# Patient Record
Sex: FEMALE | URBAN - METROPOLITAN AREA
[De-identification: names, ages, dates, MRNs, and addresses within clinical notes are randomized per-mention and may not be internally consistent; named-entity substitution may affect disease eponyms.]

---

## 2023-05-03 ENCOUNTER — OFFICE (OUTPATIENT)
Dept: URBAN - METROPOLITAN AREA CLINIC 90 | Facility: CLINIC | Age: 78
Setting detail: OPHTHALMOLOGY
End: 2023-05-03
Payer: MEDICARE

## 2023-05-03 VITALS — HEIGHT: 55 IN

## 2023-05-03 DIAGNOSIS — H01.002: ICD-10-CM

## 2023-05-03 DIAGNOSIS — H35.373: ICD-10-CM

## 2023-05-03 DIAGNOSIS — H35.3111: ICD-10-CM

## 2023-05-03 DIAGNOSIS — H16.223: ICD-10-CM

## 2023-05-03 DIAGNOSIS — H35.433: ICD-10-CM

## 2023-05-03 DIAGNOSIS — H35.033: ICD-10-CM

## 2023-05-03 DIAGNOSIS — H01.005: ICD-10-CM

## 2023-05-03 DIAGNOSIS — H25.13: ICD-10-CM

## 2023-05-03 PROCEDURE — 92004 COMPRE OPH EXAM NEW PT 1/>: CPT | Performed by: OPHTHALMOLOGY

## 2023-05-03 PROCEDURE — 92250 FUNDUS PHOTOGRAPHY W/I&R: CPT | Performed by: OPHTHALMOLOGY

## 2023-05-03 ASSESSMENT — VISUAL ACUITY
OS_BCVA: 20/40-2
OD_BCVA: 20/30-2

## 2023-05-03 ASSESSMENT — REFRACTION_MANIFEST
OD_ADD: +2.25
OD_SPHERE: +3.75
OS_CYLINDER: -0.75
OS_AXIS: 071
OD_SPHERE: +1.50
OD_AXIS: 101
OD_ADD: +2.25
OS_VA2: 20/25(J1)
OS_SPHERE: +0.75
OS_CYLINDER: -0.75
OD_CYLINDER: -1.75
OS_VA1: 20/25-
OS_SPHERE: +1.00
OS_ADD: +2.25
OD_VA1: 20/25-
OS_VA1: 20/30+
OD_SPHERE: +1.00
OD_AXIS: 097
OD_CYLINDER: -1.00
OD_AXIS: 101
OD_CYLINDER: -1.75
OS_CYLINDER: -0.75
OS_AXIS: 069
OS_SPHERE: +3.25
OD_VA1: 20/25
OD_VA1: 20/25-
OS_VA1: 20/30+
OS_AXIS: 069
OD_VA2: 20/25(J1)
OS_ADD: +2.25
OS_VA2: 20/25(J1)
OD_VA2: 20/25(J1)

## 2023-05-03 ASSESSMENT — LID POSITION - DERMATOCHALASIS
OD_DERMATOCHALASIS: RLL RUL 1+
OS_DERMATOCHALASIS: LLL LUL 1+

## 2023-05-03 ASSESSMENT — REFRACTION_AUTOREFRACTION
OD_AXIS: 103
OS_CYLINDER: -0.75
OD_CYLINDER: -2.00
OS_AXIS: 080
OD_SPHERE: +2.00
OS_SPHERE: +1.50

## 2023-05-03 ASSESSMENT — KERATOMETRY
OD_AXISANGLE_DEGREES: 025
OS_K2POWER_DIOPTERS: 43.50
OS_AXISANGLE_DEGREES: 179
OD_K2POWER_DIOPTERS: 44.00
METHOD_AUTO_MANUAL: AUTO
OD_K1POWER_DIOPTERS: 43.00
OS_K1POWER_DIOPTERS: 43.00

## 2023-05-03 ASSESSMENT — REFRACTION_CURRENTRX
OD_CYLINDER: -1.00
OS_OVR_VA: 20/
OD_OVR_VA: 20/
OD_SPHERE: +3.00
OS_CYLINDER: -0.50
OD_AXIS: 097
OD_VPRISM_DIRECTION: SV
OS_AXIS: 060
OS_SPHERE: +2.50

## 2023-05-03 ASSESSMENT — TONOMETRY
OD_IOP_MMHG: 14
OS_IOP_MMHG: 16

## 2023-05-03 ASSESSMENT — AXIALLENGTH_DERIVED
OS_AL: 22.6066
OS_AL: 23.4412
OD_AL: 22.5226
OD_AL: 23.3987
OD_AL: 23.2086
OS_AL: 23.2504
OD_AL: 23.3509
OS_AL: 23.5378

## 2023-05-03 ASSESSMENT — SPHEQUIV_DERIVED
OD_SPHEQUIV: 2.875
OD_SPHEQUIV: 0.625
OD_SPHEQUIV: 0.5
OD_SPHEQUIV: 1
OS_SPHEQUIV: 0.625
OS_SPHEQUIV: 2.875
OS_SPHEQUIV: 1.125
OS_SPHEQUIV: 0.375

## 2023-05-03 ASSESSMENT — TEAR BREAK UP TIME (TBUT)
OD_TBUT: 1+
OS_TBUT: 1+

## 2023-05-03 ASSESSMENT — CONFRONTATIONAL VISUAL FIELD TEST (CVF)
OD_FINDINGS: FULL
OS_FINDINGS: FULL

## 2025-04-20 ENCOUNTER — INPATIENT (INPATIENT)
Facility: HOSPITAL | Age: 80
LOS: 4 days | Discharge: NURSING, REHAB OR LONG-TERM CARE | DRG: 534 | End: 2025-04-25
Attending: ORTHOPAEDIC SURGERY | Admitting: ORTHOPAEDIC SURGERY
Payer: MEDICARE

## 2025-04-20 VITALS
WEIGHT: 119.93 LBS | TEMPERATURE: 98 F | HEART RATE: 87 BPM | RESPIRATION RATE: 18 BRPM | DIASTOLIC BLOOD PRESSURE: 66 MMHG | SYSTOLIC BLOOD PRESSURE: 181 MMHG | HEIGHT: 65 IN | OXYGEN SATURATION: 95 %

## 2025-04-20 DIAGNOSIS — S72.91XA UNSPECIFIED FRACTURE OF RIGHT FEMUR, INITIAL ENCOUNTER FOR CLOSED FRACTURE: ICD-10-CM

## 2025-04-20 DIAGNOSIS — Z87.442 PERSONAL HISTORY OF URINARY CALCULI: Chronic | ICD-10-CM

## 2025-04-20 LAB
ALBUMIN SERPL ELPH-MCNC: 4.4 G/DL — SIGNIFICANT CHANGE UP (ref 3.3–5)
ALP SERPL-CCNC: 51 U/L — SIGNIFICANT CHANGE UP (ref 40–120)
ALT FLD-CCNC: 19 U/L — SIGNIFICANT CHANGE UP (ref 10–45)
ANION GAP SERPL CALC-SCNC: 16 MMOL/L — SIGNIFICANT CHANGE UP (ref 5–17)
ANION GAP SERPL CALC-SCNC: 18 MMOL/L — HIGH (ref 5–17)
APTT BLD: 51.9 SEC — HIGH (ref 24.5–35.6)
AST SERPL-CCNC: 26 U/L — SIGNIFICANT CHANGE UP (ref 10–40)
BASOPHILS # BLD AUTO: 0.04 K/UL — SIGNIFICANT CHANGE UP (ref 0–0.2)
BASOPHILS NFR BLD AUTO: 0.5 % — SIGNIFICANT CHANGE UP (ref 0–2)
BILIRUB SERPL-MCNC: 0.5 MG/DL — SIGNIFICANT CHANGE UP (ref 0.2–1.2)
BLD GP AB SCN SERPL QL: NEGATIVE — SIGNIFICANT CHANGE UP
BUN SERPL-MCNC: 26 MG/DL — HIGH (ref 7–23)
BUN SERPL-MCNC: 32 MG/DL — HIGH (ref 7–23)
CALCIUM SERPL-MCNC: 10.2 MG/DL — SIGNIFICANT CHANGE UP (ref 8.4–10.5)
CALCIUM SERPL-MCNC: 8.5 MG/DL — SIGNIFICANT CHANGE UP (ref 8.4–10.5)
CHLORIDE SERPL-SCNC: 103 MMOL/L — SIGNIFICANT CHANGE UP (ref 96–108)
CHLORIDE SERPL-SCNC: 103 MMOL/L — SIGNIFICANT CHANGE UP (ref 96–108)
CO2 SERPL-SCNC: 17 MMOL/L — LOW (ref 22–31)
CO2 SERPL-SCNC: 20 MMOL/L — LOW (ref 22–31)
CREAT SERPL-MCNC: 1.13 MG/DL — SIGNIFICANT CHANGE UP (ref 0.5–1.3)
CREAT SERPL-MCNC: 1.25 MG/DL — SIGNIFICANT CHANGE UP (ref 0.5–1.3)
EGFR: 44 ML/MIN/1.73M2 — LOW
EGFR: 44 ML/MIN/1.73M2 — LOW
EGFR: 49 ML/MIN/1.73M2 — LOW
EGFR: 49 ML/MIN/1.73M2 — LOW
EOSINOPHIL # BLD AUTO: 0.1 K/UL — SIGNIFICANT CHANGE UP (ref 0–0.5)
EOSINOPHIL NFR BLD AUTO: 1.3 % — SIGNIFICANT CHANGE UP (ref 0–6)
ETHANOL SERPL-MCNC: <10 MG/DL — SIGNIFICANT CHANGE UP (ref 0–10)
GAS PNL BLDV: SIGNIFICANT CHANGE UP
GLUCOSE SERPL-MCNC: 120 MG/DL — HIGH (ref 70–99)
GLUCOSE SERPL-MCNC: 136 MG/DL — HIGH (ref 70–99)
HCT VFR BLD CALC: 36.9 % — SIGNIFICANT CHANGE UP (ref 34.5–45)
HCT VFR BLD CALC: 38.3 % — SIGNIFICANT CHANGE UP (ref 34.5–45)
HGB BLD-MCNC: 12.2 G/DL — SIGNIFICANT CHANGE UP (ref 11.5–15.5)
HGB BLD-MCNC: 13.3 G/DL — SIGNIFICANT CHANGE UP (ref 11.5–15.5)
IMM GRANULOCYTES NFR BLD AUTO: 0.3 % — SIGNIFICANT CHANGE UP (ref 0–0.9)
INR BLD: 0.95 RATIO — SIGNIFICANT CHANGE UP (ref 0.85–1.16)
LIDOCAIN IGE QN: 68 U/L — HIGH (ref 7–60)
LYMPHOCYTES # BLD AUTO: 3.11 K/UL — SIGNIFICANT CHANGE UP (ref 1–3.3)
LYMPHOCYTES # BLD AUTO: 40.3 % — SIGNIFICANT CHANGE UP (ref 13–44)
MCHC RBC-ENTMCNC: 30 PG — SIGNIFICANT CHANGE UP (ref 27–34)
MCHC RBC-ENTMCNC: 31.2 PG — SIGNIFICANT CHANGE UP (ref 27–34)
MCHC RBC-ENTMCNC: 33.1 G/DL — SIGNIFICANT CHANGE UP (ref 32–36)
MCHC RBC-ENTMCNC: 34.7 G/DL — SIGNIFICANT CHANGE UP (ref 32–36)
MCV RBC AUTO: 89.9 FL — SIGNIFICANT CHANGE UP (ref 80–100)
MCV RBC AUTO: 90.9 FL — SIGNIFICANT CHANGE UP (ref 80–100)
MONOCYTES # BLD AUTO: 0.47 K/UL — SIGNIFICANT CHANGE UP (ref 0–0.9)
MONOCYTES NFR BLD AUTO: 6.1 % — SIGNIFICANT CHANGE UP (ref 2–14)
NEUTROPHILS # BLD AUTO: 3.97 K/UL — SIGNIFICANT CHANGE UP (ref 1.8–7.4)
NEUTROPHILS NFR BLD AUTO: 51.5 % — SIGNIFICANT CHANGE UP (ref 43–77)
NRBC BLD AUTO-RTO: 0 /100 WBCS — SIGNIFICANT CHANGE UP (ref 0–0)
NRBC BLD AUTO-RTO: 0 /100 WBCS — SIGNIFICANT CHANGE UP (ref 0–0)
PLATELET # BLD AUTO: 212 K/UL — SIGNIFICANT CHANGE UP (ref 150–400)
PLATELET # BLD AUTO: 213 K/UL — SIGNIFICANT CHANGE UP (ref 150–400)
POTASSIUM SERPL-MCNC: 3.8 MMOL/L — SIGNIFICANT CHANGE UP (ref 3.5–5.3)
POTASSIUM SERPL-MCNC: 4.5 MMOL/L — SIGNIFICANT CHANGE UP (ref 3.5–5.3)
POTASSIUM SERPL-SCNC: 3.8 MMOL/L — SIGNIFICANT CHANGE UP (ref 3.5–5.3)
POTASSIUM SERPL-SCNC: 4.5 MMOL/L — SIGNIFICANT CHANGE UP (ref 3.5–5.3)
PROT SERPL-MCNC: 7.4 G/DL — SIGNIFICANT CHANGE UP (ref 6–8.3)
PROTHROM AB SERPL-ACNC: 10.8 SEC — SIGNIFICANT CHANGE UP (ref 9.9–13.4)
RBC # BLD: 4.06 M/UL — SIGNIFICANT CHANGE UP (ref 3.8–5.2)
RBC # BLD: 4.26 M/UL — SIGNIFICANT CHANGE UP (ref 3.8–5.2)
RBC # FLD: 12.6 % — SIGNIFICANT CHANGE UP (ref 10.3–14.5)
RBC # FLD: 12.7 % — SIGNIFICANT CHANGE UP (ref 10.3–14.5)
RH IG SCN BLD-IMP: POSITIVE — SIGNIFICANT CHANGE UP
SODIUM SERPL-SCNC: 138 MMOL/L — SIGNIFICANT CHANGE UP (ref 135–145)
SODIUM SERPL-SCNC: 139 MMOL/L — SIGNIFICANT CHANGE UP (ref 135–145)
WBC # BLD: 11.79 K/UL — HIGH (ref 3.8–10.5)
WBC # BLD: 7.71 K/UL — SIGNIFICANT CHANGE UP (ref 3.8–10.5)
WBC # FLD AUTO: 11.79 K/UL — HIGH (ref 3.8–10.5)
WBC # FLD AUTO: 7.71 K/UL — SIGNIFICANT CHANGE UP (ref 3.8–10.5)

## 2025-04-20 PROCEDURE — 74177 CT ABD & PELVIS W/CONTRAST: CPT | Mod: 26

## 2025-04-20 PROCEDURE — 72131 CT LUMBAR SPINE W/O DYE: CPT | Mod: 26

## 2025-04-20 PROCEDURE — 70450 CT HEAD/BRAIN W/O DYE: CPT | Mod: 26

## 2025-04-20 PROCEDURE — 72128 CT CHEST SPINE W/O DYE: CPT | Mod: 26

## 2025-04-20 PROCEDURE — 73552 X-RAY EXAM OF FEMUR 2/>: CPT | Mod: 26,RT,77

## 2025-04-20 PROCEDURE — 93010 ELECTROCARDIOGRAM REPORT: CPT

## 2025-04-20 PROCEDURE — 99285 EMERGENCY DEPT VISIT HI MDM: CPT

## 2025-04-20 PROCEDURE — 73552 X-RAY EXAM OF FEMUR 2/>: CPT | Mod: 26,RT

## 2025-04-20 PROCEDURE — 71045 X-RAY EXAM CHEST 1 VIEW: CPT | Mod: 26

## 2025-04-20 PROCEDURE — 72170 X-RAY EXAM OF PELVIS: CPT | Mod: 26,XE

## 2025-04-20 PROCEDURE — 73502 X-RAY EXAM HIP UNI 2-3 VIEWS: CPT | Mod: 26,RT

## 2025-04-20 PROCEDURE — 73560 X-RAY EXAM OF KNEE 1 OR 2: CPT | Mod: 26,RT

## 2025-04-20 PROCEDURE — 71260 CT THORAX DX C+: CPT | Mod: 26

## 2025-04-20 PROCEDURE — 72125 CT NECK SPINE W/O DYE: CPT | Mod: 26

## 2025-04-20 RX ORDER — POLYETHYLENE GLYCOL 3350 17 G/17G
17 POWDER, FOR SOLUTION ORAL AT BEDTIME
Refills: 0 | Status: DISCONTINUED | OUTPATIENT
Start: 2025-04-20 | End: 2025-04-25

## 2025-04-20 RX ORDER — MAGNESIUM HYDROXIDE 400 MG/5ML
30 SUSPENSION ORAL DAILY
Refills: 0 | Status: DISCONTINUED | OUTPATIENT
Start: 2025-04-20 | End: 2025-04-25

## 2025-04-20 RX ORDER — ACETAMINOPHEN 500 MG/5ML
1000 LIQUID (ML) ORAL ONCE
Refills: 0 | Status: COMPLETED | OUTPATIENT
Start: 2025-04-20 | End: 2025-04-20

## 2025-04-20 RX ORDER — TRAMADOL HYDROCHLORIDE 50 MG/1
50 TABLET, FILM COATED ORAL EVERY 6 HOURS
Refills: 0 | Status: DISCONTINUED | OUTPATIENT
Start: 2025-04-20 | End: 2025-04-25

## 2025-04-20 RX ORDER — OXYCODONE HYDROCHLORIDE 30 MG/1
5 TABLET ORAL
Refills: 0 | Status: DISCONTINUED | OUTPATIENT
Start: 2025-04-20 | End: 2025-04-25

## 2025-04-20 RX ORDER — RIVAROXABAN 10 MG/1
10 TABLET, FILM COATED ORAL DAILY
Refills: 0 | Status: DISCONTINUED | OUTPATIENT
Start: 2025-04-21 | End: 2025-04-25

## 2025-04-20 RX ORDER — ONDANSETRON HCL/PF 4 MG/2 ML
4 VIAL (ML) INJECTION ONCE
Refills: 0 | Status: DISCONTINUED | OUTPATIENT
Start: 2025-04-20 | End: 2025-04-20

## 2025-04-20 RX ORDER — HYDROMORPHONE/SOD CHLOR,ISO/PF 2 MG/10 ML
0.5 SYRINGE (ML) INJECTION
Refills: 0 | Status: DISCONTINUED | OUTPATIENT
Start: 2025-04-20 | End: 2025-04-20

## 2025-04-20 RX ORDER — SENNA 187 MG
2 TABLET ORAL AT BEDTIME
Refills: 0 | Status: DISCONTINUED | OUTPATIENT
Start: 2025-04-20 | End: 2025-04-25

## 2025-04-20 RX ORDER — ONDANSETRON HCL/PF 4 MG/2 ML
4 VIAL (ML) INJECTION EVERY 6 HOURS
Refills: 0 | Status: DISCONTINUED | OUTPATIENT
Start: 2025-04-20 | End: 2025-04-25

## 2025-04-20 RX ORDER — OXYCODONE HYDROCHLORIDE 30 MG/1
10 TABLET ORAL
Refills: 0 | Status: DISCONTINUED | OUTPATIENT
Start: 2025-04-20 | End: 2025-04-25

## 2025-04-20 RX ORDER — ACETAMINOPHEN 500 MG/5ML
975 LIQUID (ML) ORAL EVERY 8 HOURS
Refills: 0 | Status: DISCONTINUED | OUTPATIENT
Start: 2025-04-20 | End: 2025-04-25

## 2025-04-20 RX ORDER — CEFAZOLIN SODIUM IN 0.9 % NACL 3 G/100 ML
2000 INTRAVENOUS SOLUTION, PIGGYBACK (ML) INTRAVENOUS EVERY 8 HOURS
Refills: 0 | Status: COMPLETED | OUTPATIENT
Start: 2025-04-20 | End: 2025-04-21

## 2025-04-20 RX ORDER — HYDROMORPHONE/SOD CHLOR,ISO/PF 2 MG/10 ML
1 SYRINGE (ML) INJECTION
Refills: 0 | Status: DISCONTINUED | OUTPATIENT
Start: 2025-04-20 | End: 2025-04-20

## 2025-04-20 RX ADMIN — Medication 400 MILLIGRAM(S): at 16:00

## 2025-04-20 RX ADMIN — Medication 4 MILLIGRAM(S): at 15:32

## 2025-04-20 RX ADMIN — Medication 1000 MILLIGRAM(S): at 22:50

## 2025-04-20 RX ADMIN — Medication 4 MILLIGRAM(S): at 17:54

## 2025-04-20 RX ADMIN — Medication 400 MILLIGRAM(S): at 22:22

## 2025-04-20 RX ADMIN — Medication 125 MILLILITER(S): at 22:23

## 2025-04-20 NOTE — H&P ADULT - HISTORY OF PRESENT ILLNESS
HPI  80yFemale w/ pmh of hypertension, anxiety c/o R hip pain s/p mechanical fall 5 feet from a ladder this afternoon. Unable to bear weight in the RLE since the fall. Denies headstrike or LOC. Denies numbness/tingling in the RLE. Denies any other trauma/injuries at this time. At baseline, community ambulator w/o assistive devices.    ROS  Negative unless otherwise specified in HPI.    PAST MEDICAL & SURGICAL Hx  PAST MEDICAL & SURGICAL HISTORY:  Hypertension          MEDICATIONS  Home Medications:      ALLERGIES  No Known Allergies      FAMILY Hx  FAMILY HISTORY:      SOCIAL Hx  Social History:      VITALS  Vital Signs Last 24 Hrs  T(C): 36.7 (20 Apr 2025 17:44), Max: 36.7 (20 Apr 2025 17:44)  T(F): 98 (20 Apr 2025 17:44), Max: 98 (20 Apr 2025 17:44)  HR: 78 (20 Apr 2025 17:44) (78 - 89)  BP: 228/94 (20 Apr 2025 17:44) (181/66 - 245/112)  BP(mean): --  RR: 26 (20 Apr 2025 17:44) (18 - 27)  SpO2: 99% (20 Apr 2025 17:44) (95% - 99%)    Parameters below as of 20 Apr 2025 17:44  Patient On (Oxygen Delivery Method): room air        PHYSICAL EXAM  Gen: Lying in bed, NAD  Resp: No increased WOB  RLE:  Skin intact, shortened and externally rotated, +edema and +ecchymosis over R hip  +TTP over R thigh, no TTP along remainder of extremity; compartments soft  Motor: TA/EHL/GS/FHL intact  Sensory: DP/SP/Tib/Leann/Saph SILT  +DP pulse, WWP    Secondary survey:  No TTP along spine or other extremities, pelvis grossly stable, SILT and compartments soft throughout    LABS                        13.3   7.71  )-----------( 212      ( 20 Apr 2025 15:38 )             38.3     04-20    138  |  103  |  32[H]  ----------------------------<  120[H]  3.8   |  17[L]  |  1.25    Ca    10.2      20 Apr 2025 15:38    TPro  7.4  /  Alb  4.4  /  TBili  0.5  /  DBili  x   /  AST  26  /  ALT  19  /  AlkPhos  51  04-20    PT/INR - ( 20 Apr 2025 15:38 )   PT: 10.8 sec;   INR: 0.95 ratio         PTT - ( 20 Apr 2025 15:38 )  PTT:51.9 sec    IMAGING  XRs: R femoral shaft fx (personal read)    ASSESSMENT & PLAN  80yFemale w/ R femoral shaft fx.  -NWB RLE, bedrest  -OR 4/20/25  -hold chemical DVT ppx for OR; SCDs OK  -pain control  -ice/cold compress

## 2025-04-20 NOTE — PRE-ANESTHESIA EVALUATION ADULT - BMI (KG/M2)
Detail Level: Detailed Was A Bandage Applied: Yes Punch Size In Mm: 4 Biopsy Type: H and E Anesthesia Type: 1% lidocaine with epinephrine Anesthesia Volume In Cc: 0.5 Additional Anesthesia Volume In Cc (Will Not Render If 0): 0 Hemostasis: None Epidermal Sutures: 4-0 Plain Gut 20 Wound Care: Petrolatum Dressing: bandage Suture Removal: 14 days Patient Will Remove Sutures At Home?: No Lab: [id number] Lab Facility: [id number] Consent: Written consent was obtained and risks were reviewed including but not limited to scarring, infection, bleeding, scabbing, incomplete removal, nerve damage and allergy to anesthesia. Post-Care Instructions: I reviewed with the patient in detail post-care instructions. Patient is to keep the biopsy site dry overnight, and then apply bacitracin twice daily until healed. Patient may apply hydrogen peroxide soaks to remove any crusting. Home Suture Removal Text: Patient was provided a home suture removal kit and will remove their sutures at home.  If they have any questions or difficulties they will call the office. Notification Instructions: Patient will be notified of biopsy results. However, patient instructed to call the office if not contacted within 2 weeks. Billing Type: Third-Party Bill Information: Selecting Yes will display possible errors in your note based on the variables you have selected. This validation is only offered as a suggestion for you. PLEASE NOTE THAT THE VALIDATION TEXT WILL BE REMOVED WHEN YOU FINALIZE YOUR NOTE. IF YOU WANT TO FAX A PRELIMINARY NOTE YOU WILL NEED TO TOGGLE THIS TO 'NO' IF YOU DO NOT WANT IT IN YOUR FAXED NOTE.

## 2025-04-20 NOTE — ED ADULT NURSE NOTE - OBJECTIVE STATEMENT
81 yo female with a PMH of HTN and anxiety presents to the ED via EMS from home complaining of an unwitnessed fall. Patient was on a 10foot ladder when she had a misstep falling backwards. Denies LOC or AC use. Patient landed on her R side of hip. Was unable to ambulate after and had to have neighbors and  call 911. PIV established by EMS, 18G LAC. Total of 100mcg of Fentanyl given PTA. Upon arrival, obvious deformity noted to R hip. + pulses, extremity is warm to touch. Sensation in tact. Arrives in c-collar. Denies headache, dizziness, vision changes, chest pain, shortness of breath, abdominal pain, nausea, vomiting, diarrhea, fevers, chills, dysuria, hematuria, recent illness travel.

## 2025-04-20 NOTE — ED ADULT NURSE REASSESSMENT NOTE - NS ED NURSE REASSESS COMMENT FT1
Indwelling urinary catheter placed using aseptic technique. Sterile equipment utalized. Second RN present to confirm sterility. Draining to gravity - initial output of 150ml. Secured w/ stat lock to upper leg. Explained procedure as it was being done - Pt tolerated procedure well. Comfort and safety provided.

## 2025-04-20 NOTE — ED ADULT NURSE NOTE - NSFALLHARMRISKINTERV_ED_ALL_ED

## 2025-04-20 NOTE — ED PROVIDER NOTE - ATTENDING CONTRIBUTION TO CARE
80-year-old female with past medical history of hypertension, presents to the emergency department with right lower extremity injury status post fall today.  Per EMS, patient fell from 10 foot ladder that was not secured.  Patient denies head strike or loss of consciousness.  Not on anticoagulation.  EMS provided 100 mcg of fentanyl prior to arrival which pt reports minimal relief. Denies fever, chills, chest pain, shortness of breath, abdominal pain, n/v/d, numbness, weakness, tingling, headache.     Physical Exam:  Gen: Awake and alert, moderate acute distress.  HEENT: Atraumatic, oropharynx clear, moist mucous membranes, normal conjunctiva  Cardio: RRR, no murmurs, rubs or gallops  Lung: CTAB, no respiratory distress, no wheezes/rhonchi/rales B/L  Abd: soft, NT, ND, no guarding, no rigidity, no rebound tenderness  MSK: No cervical, thoracic, lumbar midline tenderness to palpation or bony step-offs.  RLE: Obvious deformity, extremity is externally rotated and shortened.  Significant swelling to right hip.  Pelvis is stable. NV intact. 2+dp pulses. compartment is soft  Neuro: No focal sensory or motor deficits  Skin: Warm, well perfused, no rash, no leg swelling     Patient presents with traumatic right lower extremity injury.  Hypertensive, otherwise vitals within normal limits.  Nontoxic-appearing.  Considering hip fracture versus dislocation, pelvis fracture, intracranial hemorrhage, internal organ injury, thoracic/lumbar bony pathology.  FAST negative for free fluid  CBC, CMP, coags, VBG, pan scan, x-rays, reassess

## 2025-04-20 NOTE — CONSULT NOTE ADULT - TIME BILLING
I saw and evaluated patient and agree with residents note  injury localized to right femor  no further concerns from trauma standpoint  agree with orthopedic surgery planned intervention

## 2025-04-20 NOTE — ED PROVIDER NOTE - PHYSICAL EXAMINATION
GENERAL: +in acute distress and discomfort  HEAD: normocephalic, atraumatic, no camacho sign or raccoon sign  HEENT: normal conjunctiva, oral mucosa moist, neck supple  CARDIAC: regular rate and rhythm, normal S1S2, no appreciable murmurs, 2+ pulses in UE/LE b/l  PULM: normal breath sounds, clear to ascultation bilaterally, no rales, rhonchi, wheezing  GI: abdomen nondistended, soft, nontender, no guarding, rebound tenderness  : no pelvic instability, no CVA tenderness b/l, no suprapubic tenderness  NEURO: moving all extremities, no focal motor or sensory deficits, CN2-12 intact, normal speech, PERRLA, EOMI, AAOx3  MSK: +R hip external rotated and shortened with surround edema and exquisite tenderness  SKIN: +L knee abrasion, well-perfused, extremities warm, no visible rashes  PSYCH: appropriate mood and affect

## 2025-04-20 NOTE — ED PROVIDER NOTE - PROGRESS NOTE DETAILS
Darrell Gallagher MD  XR shows fractured R femur. Ortho consulted: to see patient Darrell Gallagher MD  ortho to take pt to OR. Trauma team aware and to see patient Meng Odell, PGY3 - patient admitted to ortho service under Ashu Asencio

## 2025-04-20 NOTE — ED PROVIDER NOTE - NS ED ROS FT
General: +fall, denies fever, chills  HENT: denies nasal congestion, rhinorrhea  Eyes: denies visual changes, blurred vision  CV: denies chest pain, palpitations  Resp: denies difficulty breathing, cough  Abdominal: denies nausea, vomiting, diarrhea, abdominal pain  : denies urinary pain or discharge  MSK: +R hip pain, denies spinal or pelvic pain  Neuro: denies headaches, numbness, tingling, LOC  Skin: denies rashes, bruises

## 2025-04-20 NOTE — ED ADULT NURSE NOTE - NS ED NURSE PATIENT LEFT UNIT TIME
Patient to ed with complaints of head pain in the back of her head which patient noticed when she woke-up Sat. Patient reports her last known well date was Friday evening when she went to bed. Patient has numbness in her right leg however she reports this has been a chronic problem for her and is not new. Patient also reports she has been taking medicine for \"heart burn\" ekg bedside.      Jose Dickson RN  04/01/22 0465 19:12

## 2025-04-20 NOTE — ED PROVIDER NOTE - CLINICAL SUMMARY MEDICAL DECISION MAKING FREE TEXT BOX
this is a 80-year-old female past medical history of hypertension presenting after fall.  Shortly prior to arrival patient was on a 10 foot ladder that was not secured leading her to fall.  She endorsed right hip pain but denied head strike or LOC.  No AC.  Patient was not able to get up and stand afterwards.  Patient denies other injuries.  Per EMS patient alert and oriented x 3 during transport, was given 50 x 2 of Fent by EMS.  EMS also noted a blood pressure in 180s systolic.   patient endorses right hip pain but denies other pain.  Patient denies fever, headache, vision changes, chest pain, shortness of breath, abdominal pain, nausea, vomiting.       on presentation patient /100, heart rate 80, saturating well on room air.  Patient alert and oriented x 3 but in significant pain. exam demonstrates shortened and externally rotated right hip with surrounding edema and exquisite tenderness, all extremities warm and with strong pulses no Rico sign no raccoon sign, no scalp abrasions deformities or contusions, spine nontender without step-offs or deformities, lungs clear, normal heart sounds, soft nontender nondistended abdomen, abrasion to left knee.  Trauma presentation concerning for right hip/femur dislocation/fracture.  Also send trauma labs, CT head spine chest and abdomen, x-rays of chest pelvis and lower extremities.  Pain control.  Likely Ortho consult.

## 2025-04-21 DIAGNOSIS — I10 ESSENTIAL (PRIMARY) HYPERTENSION: ICD-10-CM

## 2025-04-21 DIAGNOSIS — S72.301A UNSPECIFIED FRACTURE OF SHAFT OF RIGHT FEMUR, INITIAL ENCOUNTER FOR CLOSED FRACTURE: ICD-10-CM

## 2025-04-21 DIAGNOSIS — R52 PAIN, UNSPECIFIED: ICD-10-CM

## 2025-04-21 LAB
ANION GAP SERPL CALC-SCNC: 13 MMOL/L — SIGNIFICANT CHANGE UP (ref 5–17)
BUN SERPL-MCNC: 27 MG/DL — HIGH (ref 7–23)
CALCIUM SERPL-MCNC: 8.2 MG/DL — LOW (ref 8.4–10.5)
CHLORIDE SERPL-SCNC: 104 MMOL/L — SIGNIFICANT CHANGE UP (ref 96–108)
CO2 SERPL-SCNC: 20 MMOL/L — LOW (ref 22–31)
CREAT SERPL-MCNC: 1.23 MG/DL — SIGNIFICANT CHANGE UP (ref 0.5–1.3)
EGFR: 44 ML/MIN/1.73M2 — LOW
EGFR: 44 ML/MIN/1.73M2 — LOW
GLUCOSE SERPL-MCNC: 174 MG/DL — HIGH (ref 70–99)
HCT VFR BLD CALC: 31.3 % — LOW (ref 34.5–45)
HGB BLD-MCNC: 10.3 G/DL — LOW (ref 11.5–15.5)
MCHC RBC-ENTMCNC: 30.4 PG — SIGNIFICANT CHANGE UP (ref 27–34)
MCHC RBC-ENTMCNC: 32.9 G/DL — SIGNIFICANT CHANGE UP (ref 32–36)
MCV RBC AUTO: 92.3 FL — SIGNIFICANT CHANGE UP (ref 80–100)
NRBC BLD AUTO-RTO: 0 /100 WBCS — SIGNIFICANT CHANGE UP (ref 0–0)
PLATELET # BLD AUTO: 199 K/UL — SIGNIFICANT CHANGE UP (ref 150–400)
POTASSIUM SERPL-MCNC: 4.5 MMOL/L — SIGNIFICANT CHANGE UP (ref 3.5–5.3)
POTASSIUM SERPL-SCNC: 4.5 MMOL/L — SIGNIFICANT CHANGE UP (ref 3.5–5.3)
RBC # BLD: 3.39 M/UL — LOW (ref 3.8–5.2)
RBC # FLD: 12.8 % — SIGNIFICANT CHANGE UP (ref 10.3–14.5)
SODIUM SERPL-SCNC: 137 MMOL/L — SIGNIFICANT CHANGE UP (ref 135–145)
WBC # BLD: 7.77 K/UL — SIGNIFICANT CHANGE UP (ref 3.8–10.5)
WBC # FLD AUTO: 7.77 K/UL — SIGNIFICANT CHANGE UP (ref 3.8–10.5)

## 2025-04-21 RX ADMIN — Medication 100 MILLIGRAM(S): at 11:52

## 2025-04-21 RX ADMIN — Medication 975 MILLIGRAM(S): at 13:14

## 2025-04-21 RX ADMIN — Medication 2 TABLET(S): at 21:05

## 2025-04-21 RX ADMIN — POLYETHYLENE GLYCOL 3350 17 GRAM(S): 17 POWDER, FOR SOLUTION ORAL at 21:06

## 2025-04-21 RX ADMIN — Medication 975 MILLIGRAM(S): at 21:05

## 2025-04-21 RX ADMIN — Medication 975 MILLIGRAM(S): at 22:55

## 2025-04-21 RX ADMIN — OXYCODONE HYDROCHLORIDE 10 MILLIGRAM(S): 30 TABLET ORAL at 19:38

## 2025-04-21 RX ADMIN — Medication 100 MILLIGRAM(S): at 05:10

## 2025-04-21 RX ADMIN — RIVAROXABAN 10 MILLIGRAM(S): 10 TABLET, FILM COATED ORAL at 11:55

## 2025-04-21 RX ADMIN — Medication 975 MILLIGRAM(S): at 05:09

## 2025-04-21 RX ADMIN — OXYCODONE HYDROCHLORIDE 10 MILLIGRAM(S): 30 TABLET ORAL at 20:30

## 2025-04-21 RX ADMIN — Medication 975 MILLIGRAM(S): at 06:09

## 2025-04-21 RX ADMIN — Medication 40 MILLIGRAM(S): at 05:10

## 2025-04-21 RX ADMIN — Medication 975 MILLIGRAM(S): at 14:14

## 2025-04-21 NOTE — PHYSICAL THERAPY INITIAL EVALUATION ADULT - PERTINENT HX OF CURRENT PROBLEM, REHAB EVAL
80yFemale w/ pmh of hypertension, anxiety c/o R hip pain s/p mechanical fall 5 feet from a ladder this afternoon. Unable to bear weight in the RLE since the fall. Denies headstrike or LOC. Denies numbness/tingling in the RLE. Denies any other trauma/injuries at this time. At baseline, community ambulator w/o assistive devices.  CT Brain/ cervical spine/ thoracic spine/ lumbar spine/ abdominal 4/20, mild cervical spondylosis, advanced spondylosis at L5-S1 with unilateral Lt L5 pars defect, CXR (-). XR Pelvis/ Rt hip/ Rt femur/ Rt knee 4/20, Rt proximal femoral shaft fracture.

## 2025-04-21 NOTE — PHYSICAL THERAPY INITIAL EVALUATION ADULT - NSPTDISCHREC_GEN_A_CORE
if home, Home with Home PT for strengthening, bed mob, transfer, gait and balance training. home with assist for all functional mobility, rolling walker for short distance, transport wheelchair for long distance, 3-1 commode, hospital bed/Sub-acute Rehab

## 2025-04-21 NOTE — OCCUPATIONAL THERAPY INITIAL EVALUATION ADULT - ADDITIONAL COMMENTS
as per pt, resides in a  with spouse, +5 stairs to enter with railings, one flight indoor to bedroom, walk-in shower. PTA, pt amb (I), (I) with ADLs.

## 2025-04-21 NOTE — DISCHARGE NOTE PROVIDER - DISCHARGE SERVICE FOR PATIENT
An 88 year old male with L2 fracture   Refusing surgery    Plan:  PT evaluation  SW for placement  Analgesia    plan discussed with Dr Dumont on the discharge service for the patient. I have reviewed and made amendments to the documentation where necessary.

## 2025-04-21 NOTE — CONSULT NOTE ADULT - PROBLEM SELECTOR RECOMMENDATION 9
Patient is s/p an ORIF of the right femoral shaft fracture  Patient tolerated the procedure well  PO as tolerated  Patient to start physical therapy   DVT and GI prophylaxis as per ortho

## 2025-04-21 NOTE — DISCHARGE NOTE PROVIDER - HOSPITAL COURSE
History of Present Illness:   80yFemale w/ pmh of hypertension, anxiety c/o R hip pain s/p mechanical fall 5 feet from a ladder this afternoon. Unable to bear weight in the RLE since the fall. Denies headstrike or LOC. Denies numbness/tingling in the RLE. Denies any other trauma/injuries at this time. At baseline, community ambulator w/o assistive devices.    PAST MEDICAL & SURGICAL HISTORY:  Hypertension  anxiety    HOSPITAL COURSE:  81 y/o FM underwent right femoral shaft fracture fixation with IM nail on 4/20/2025 with Dr. Howe.  Patient tolerated procedure well.  Patient was evaluated postoperatively by physical and occupational therapists for weight bearing as tolerated ambulation and advised that patient would benefit from admission to rehab facility.  Patient advised to keep surgical incision/dressing clean and dry, and have dressing, surgical staples removed and steri strips applied post op day #15(5/5/2025)if applicable.  Patient further advised to follow up with Dr. Howe in his office 3-4 weeks post op.     History of Present Illness:   80yFemale w/ pmh of hypertension, anxiety c/o R hip pain s/p mechanical fall 5 feet from a ladder this afternoon. Unable to bear weight in the RLE since the fall. Denies headstrike or LOC. Denies numbness/tingling in the RLE. Denies any other trauma/injuries at this time. At baseline, community ambulator w/o assistive devices.    ALLERGIES:  No Known Allergies    PAST MEDICAL & SURGICAL HISTORY:  Hypertension  anxiety    HOSPITAL COURSE:  4/20/25: 79 y/o FM presents to the hospital with right lower extremity pain after a fall off a ladder, x-ray reveals right femoral shaft fracture.  Patient was admitted, medically cleared and prepped for surgery.  Patient was taken to surgery and underwent right femoral shaft fracture fixation with IM nail.  Patient tolerated procedure without complications.    Patient was evaluated postoperatively by physical and occupational therapists for weight bearing as tolerated ambulation and advised that patient would benefit from admission to rehab facility for discharge plan.  Patient received PRBC's for acute postop anemia 2nd to fracture and surgery.  Patient is stable for surgery when bed is available.

## 2025-04-21 NOTE — DISCHARGE NOTE PROVIDER - NSDCFUADDINST_GEN_ALL_CORE_FT
Continue weight bearing as tolerated ambulation with rolling walker. Keep surgical incision/dressings clean and dry. Have dressing, surgical staples removed and steri-strips applied post operative day#15(5/5/2025). Follow up with Dr. Howe in his office 3-4 weeks post op.  Please call Dr. Howe's office to schedule a follow up appointment about 14 days after surgery.   Recommend follow up with Medical MD within next 4 weeks.  Dressing should be changed every other day, may remove when dry x 2 changes.   (If staples or sutures present remove about 14 days after surgery and apply steri strips)  Out of bed, ambulate, weight bearing as tolerated-   Physical therapy to assist with exercise and help increase endurance.   Please contact Doctors office regarding arrangements for out patient Physical therapy.

## 2025-04-21 NOTE — DISCHARGE NOTE PROVIDER - NSDCMRMEDTOKEN_GEN_ALL_CORE_FT
acetaminophen 325 mg oral tablet: 3 tab(s) orally every 8 hours Continue for 4 days then as needed  ferrous sulfate 325 mg (65 mg elemental iron) oral tablet: 1 tab(s) orally once a day  folic acid 1 mg oral tablet: 1 tab(s) orally once a day  losartan 100 mg oral tablet: 1 tab(s) orally once a day Presently on hold 2nd hypotension post fracture  ondansetron 4 mg oral tablet, disintegratin tab(s) orally every 6 hours as needed for  nausea  oxyCODONE 10 mg oral tablet: 1 tab(s) orally every 4 hours as needed for Severe Pain (7 - 10)  oxyCODONE 5 mg oral tablet: 1 tab(s) orally every 4 hours as needed for Moderate Pain (4 - 6)  pantoprazole 40 mg oral delayed release tablet: 1 tab(s) orally once a day (before a meal)  polyethylene glycol 3350 oral powder for reconstitution: 17 gram(s) orally once a day (at bedtime)  rivaroxaban 10 mg oral tablet: 1 tab(s) orally once a day  senna leaf extract oral tablet: 2 tab(s) orally once a day (at bedtime)  sertraline 100 mg oral tablet: 1 tab(s) orally once a day  traMADol 50 mg oral tablet: 1 tab(s) orally every 6 hours As needed Mild Pain (1 - 3)  verapamil 240 mg/12 hours oral tablet, extended release: 1 tab(s) orally once a day Presently held 2nd to hypotension post fracture

## 2025-04-21 NOTE — PHYSICAL THERAPY INITIAL EVALUATION ADULT - IMPAIRMENTS CONTRIBUTING IMPAIRED BED MOBILITY, REHAB EVAL
CHIEF COMPLAINT:   Thick painful nails, thick calluses.    HISTORY OF PRESENT ILLNESS:  Obinna Garza presents to clinic today for evaluation and treatment of multiple ulcers and calluses on the plantar aspect of both feet.  Patient does not want me to evaluate the right great toe ulceration today. He does not want me to address the varicosities and stasis changes to his lower extremities either.    PAST MEDICAL HISTORY:     Past Medical History:   Diagnosis Date   • Adrenal insufficiency (CMS/HCC)    • Allergy    • Anxiety    • Chronic pain syndrome    • Congestive cardiac failure (CMS/HCC)    • Cushing's syndrome (CMS/HCC) 07/21/2017   • Depressive disorder    • Dermatitis    • Fibromyalgia    • GERD (gastroesophageal reflux disease)    • HTN (hypertension)    • Hypogammaglobulinemia (CMS/HCC)     Mild   • Hypothyroidism    • IBS (irritable bowel syndrome)    • Peripheral neuropathy 07/21/2017         PAST SURGICAL HISTORY:   Past Surgical History:   Procedure Laterality Date   • COLONOSCOPY DIAGNOSTIC  06/30/2010    Colonoscopy, Dx   • COLONOSCOPY W/ BIOPSIES  2/4/16    3 yr. repeat, mulitple precancerous polyps   • ESOPHAGOGASTRODUODENOSCOPY  1-28-16    normal    • ESOPHAGOGASTRODUODENOSCOPY TRANSORAL FLEX W/BX SINGLE OR MULT  01/30/2012    EGD with Bx   • ESOPHAGOGASTRODUODENOSCOPY TRANSORAL FLEX W/BX SINGLE OR MULT  01/30/2011    EGD with Bx   • LEFT HEART CATH,PERCUTANEOUS  03/01/2005    Cardiac Cath   • TONSILLECTOMY     • TRACHEOSTOMY CLOSURE  2008    had renal failure and sepsis, coded, trach placed, trach was removed when pt stable again       Medications:   Current Outpatient Prescriptions   Medication Sig Dispense Refill   • tamsulosin (FLOMAX) 0.4 MG Cap TAKE 1 CAPSULE BY MOUTH EVERY DAY AFTER A MEAL 90 capsule 0   • furosemide (LASIX) 20 MG tablet TAKE 1 TABLET BY MOUTH EVERY DAY AS NEEDED 30 tablet 2   • amoxicillin-clavulanate (AUGMENTIN) 875-125 MG per tablet Take 1 tablet by mouth every 12  hours. 20 tablet 0   • potassium chloride 10 MEQ CR tablet TAKE 1 TABLET BY MOUTH EVERY DAY WITH FUROSEMIDE WHEN NEEDED 30 tablet 0   • esomeprazole (NEXIUM) 40 MG capsule Take 40 mg by mouth daily (before breakfast).     • Flaxseed, Linseed, (FLAXSEED OIL PO) Take 1 tablet by mouth daily.     • Ascorbic Acid (VITAMIN C) 500 MG tablet Take 500 mg by mouth daily.     • Calcium Citrate-Vitamin D (CALCIUM CITRATE + D PO) Take 1 tablet by mouth daily (at noon).     • ketoconazole (NIZORAL) 2 % shampoo Use as a shampoo daily 1 Bottle 11   • levETIRAcetam (KEPPRA) 500 MG tablet TAKE 1 TABLET BY MOUTH THREE TIMES DAILY 270 tablet 0   • levothyroxine (SYNTHROID, LEVOTHROID) 25 MCG tablet Take 1 1/2 tablets daily 136 tablet 1   • DISPENSE Vit for vision support  0   • hydrocortisone (CORTEF) 10 MG tablet Take 1 tablet by mouth 2 times daily. 180 tablet 1   • gentamicin (GARAMYCIN) 0.1 % cream Apply twice a day to right big toe wound with dressing changes 30 g 2   • metoPROLOL (TOPROL-XL) 50 MG 24 hr tablet TAKE 1 TABLET BY MOUTH EVERY DAY 90 tablet 1   • metoPROLOL (TOPROL-XL) 25 MG 24 hr tablet TAKE ONE TABLET BY MOUTH EVERY DAY ALONG WITH 50 MG TABLET TO TOTAL 75 MG 90 tablet 1   • cloNIDine (CATAPRES) 0.1 MG tablet Take 0.1 mg by mouth daily.     • Sennosides (SENNA LAXATIVE) 25 MG Tab Take 1 tablet by mouth daily as needed.     • lansoprazole (PREVACID) 15 MG capsule Take 15 mg by mouth daily.     • Milk Thistle 250 MG Cap Take 1 capsule by mouth daily.     • Ginkgo Biloba 60 MG Cap Take 1 capsule by mouth daily.     • pseudoephedrine (SUDAFED) 60 MG tablet Take 60 mg by mouth daily.     • Urea 45 % Cream Apply 1 application topically daily. To feet 255 g prn   • magnesium citrate (CITROMA) Solution solution Take 300 mLs by mouth once.     • sodium biphosphate (FLEET) 7-19 GM/118ML enema Place 1 enema rectally once.     • nortriptyline (PAMELOR) 50 MG capsule Take 50 mg by mouth nightly.     • ALPRAZolam (XANAX) 1 MG  tablet Take 4 mg by mouth nightly as needed. 1 mg every morning, 1 mg at noon and 4 mg at bedtime     • Cholecalciferol (VITAMIN D3) 5000 UNITS TABS Take 1 tablet by mouth 2 times daily.      • Melatonin 5 MG TABS Take 1 tablet by mouth nightly as needed. Insomnia     • Multiple Vitamin tablet Take 1 tablet by mouth daily.       No current facility-administered medications for this visit.        Allergies:   ALLERGIES:  Bactrim; Ceftin [cefuroxime axetil]; Cefzil; Depakote; Lactulose; Levaquin; Lexapro; Biaxin [clarithromycin]; Chlorcyclizine; Chocolate; Clindamycin; Compazine; Doxycycline; Ethanol; Haldol; Ibuprofen; Lisinopril; Lunesta; Meclizine; Nabumetone; Oxycodone; Phenothiazines; Rozerem [ramelteon]; Tetracycline; Thioridazine; Zofran; Antivert [meclizine hcl]; Dihydroergotamine; Griseofulvin; Keflex; Cymbalta [duloxetine hcl]; Macrolides and ketolides; Rifamycin; and Valproic acid    Social History:   Social History     Social History   • Marital status: Significant other     Spouse name: N/A   • Number of children: N/A   • Years of education: N/A     Social History Main Topics   • Smoking status: Never Smoker   • Smokeless tobacco: Never Used   • Alcohol use 3.6 oz/week     6 Cans of beer per week      Comment: per day at bedtime to \"take away the pain\"   • Drug use: No   • Sexual activity: Not Asked     Other Topics Concern   • None     Social History Narrative   • None       Family History:   Family History   Problem Relation Age of Onset   • High blood pressure Mother    • Arthritis Mother    • Cancer Mother    • COPD Mother    • Depression Mother    • Heart disease Mother    • OTHER Mother      GI, Memory loss   • Cancer Father    • Arthritis Father    • COPD Father    • Diabetes Maternal Grandmother    • Kidney disease Brother        REVIEW OF SYSTEMS:   Patient appears alert, comfortable and oriented  Patient denies burning, numbness, tingling and claudication pain  bilateral  Patients denies pain  with normal activities  Activity level: remains physically active.      Physical Exam:  Temp 98 °F (36.7 °C) (Temporal Artery)   Ht 5' 10\" (1.778 m)   Wt 127 kg   BMI 40.18 kg/m²   General: Awake alert and oriented ×3 and in no apparent distress. Appears well-nourished.      Dermatologic: Ulcer to the right and left 5th metatarsal heads with hyperkeratotic borders, no SOI. Skin is warm dry and supple bilateral he does have an ulceration with a bandage present to the right hallux interphalangeal joint which was not evaluated today per patient request. There is no hair present. There are no other open wounds. There is some erythema present to the anterior aspect of bilateral legs which may be due to stasis dermatitis. Nails are long and thick dystrophic with subungual debris ×10.    Vascular: Pulses are weakly palpable bilateral capillary refill time is approximately 3 seconds to all digits. He does have noted edema bilateral lower extremities and varicosities. Hair is absent to the digits.    Neurologic: Intact to light touch at the level digits. He does have a positive plantar and Achilles tendon reflex. Proprioception is intact to the big toe.    Musculoskeletal: Decreased range of motion at the ankle joint. Mildly decreased medial arch. Otherwise no noted deformity. Strength is 5 out 5 to all groups. Legs are symmetrical.    Assessment:    Encounter Diagnoses   Name Primary?   • Skin ulcer of right foot, limited to breakdown of skin (CMS/HCC) Yes        Plan: Lengthy discussion regarding patient's condition. Patient did not want me to look at his ulcerations to the right great toe or the left 1st MPJ.No longer has ulcers to the legs. There is an ulcer to both the right and left 5th metatarsal heads which were once again debrided excisionally, full thickness plus subcutanous tissue using a #10 blade to a size of [id number] left and [id number] right, 100% granular base. Nails have been reduced and debrided 1-10 in both  thickness and length.  The patient may continue with topical nail treatments as desired. The patient may continue with topical lotion treatments as desired - strongly urged him to try a urea based cream to apply daily to the callus and fissures, patient likes to shop for deals on [employer] and found some on the website.  Risks and benefits have been discussed. Patient has been advised to contact the office with any problems, concerns or questions that arise prior to next appointment.       15 min with patient   decreased strength

## 2025-04-21 NOTE — CONSULT NOTE ADULT - SUBJECTIVE AND OBJECTIVE BOX
HPI  80yFemale w/ pmh of hypertension, anxiety c/o R hip pain s/p mechanical fall 5 feet from a ladder this afternoon. Unable to bear weight in the RLE since the fall. Denies headstrike or LOC. Denies numbness/tingling in the RLE. Denies any other trauma/injuries at this time. At baseline, community ambulator w/o assistive devices.    ROS  Negative unless otherwise specified in HPI.    PAST MEDICAL & SURGICAL Hx  PAST MEDICAL & SURGICAL HISTORY:  Hypertension          MEDICATIONS  Home Medications:      ALLERGIES  No Known Allergies      FAMILY Hx  FAMILY HISTORY:      SOCIAL Hx  Social History:      VITALS  Vital Signs Last 24 Hrs  T(C): 36.7 (20 Apr 2025 17:44), Max: 36.7 (20 Apr 2025 17:44)  T(F): 98 (20 Apr 2025 17:44), Max: 98 (20 Apr 2025 17:44)  HR: 78 (20 Apr 2025 17:44) (78 - 89)  BP: 228/94 (20 Apr 2025 17:44) (181/66 - 245/112)  BP(mean): --  RR: 26 (20 Apr 2025 17:44) (18 - 27)  SpO2: 99% (20 Apr 2025 17:44) (95% - 99%)    Parameters below as of 20 Apr 2025 17:44  Patient On (Oxygen Delivery Method): room air        PHYSICAL EXAM  Gen: Lying in bed, NAD  Resp: No increased WOB  RLE:  Skin intact, shortened and externally rotated, +edema and +ecchymosis over R hip  +TTP over R thigh, no TTP along remainder of extremity; compartments soft  Motor: TA/EHL/GS/FHL intact  Sensory: DP/SP/Tib/Leann/Saph SILT  +DP pulse, WWP    Secondary survey:  No TTP along spine or other extremities, pelvis grossly stable, SILT and compartments soft throughout    LABS                        13.3   7.71  )-----------( 212      ( 20 Apr 2025 15:38 )             38.3     04-20    138  |  103  |  32[H]  ----------------------------<  120[H]  3.8   |  17[L]  |  1.25    Ca    10.2      20 Apr 2025 15:38    TPro  7.4  /  Alb  4.4  /  TBili  0.5  /  DBili  x   /  AST  26  /  ALT  19  /  AlkPhos  51  04-20    PT/INR - ( 20 Apr 2025 15:38 )   PT: 10.8 sec;   INR: 0.95 ratio         PTT - ( 20 Apr 2025 15:38 )  PTT:51.9 sec    IMAGING  XRs: R femoral shaft fx (personal read)    ASSESSMENT & PLAN  80yFemale w/ R femoral shaft fx.  -NWB RLE, bedrest  -OR 4/20/25  -hold chemical DVT ppx for OR; SCDs OK  -pain control  -ice/cold compress (20 Apr 2025 19:12)      Primary Survey  A - Airway intact, trachea midline, patient verbal.   B - Equal chest wall expansion, clear to auscultation bilaterally. SpO2 99% by pulse oximetry in ED with RR 26.   C - BP in ED BP: 228/94 (04-20-25 @ 17:44) with HR 78 (78 - 89), RRR on continuous cardiac telemetry, palpable peripheral pulses.   D - GCS 15 on arrival.    Secondary survey  Exposure obtained as appropriate  General: Patient recument in stretcher, in no acute distress.   Neurologic: AOx3, GCS 15. Pupils equally reactive bilaterally.   HEENT: Normocephalic and atraumatic. No hemotympanum or Rico sign bilaterally.   Spine: No palpable stepoffs, gross bony deformity, or point tenderness in the cervical, thoracic or lumbar spine. No paraspinal tenderness.   Cardiovascular: RRR, in normal sinus rhythm on continuous telemetry. Peripheral pulses palpable.   Pulmonary and chest: Equal chest wall expansion, lungs clear to auscultation bilaterally. No hypoxemia on continuous pulse oximetry in ED. Chest nontender to palpation with no ecchymosis and no seatbelt sign.   Abdominal: Soft NTND, no rebound tenderness or guarding.   : Pelvis stable. No blood at the urethral meatus.   Extremities: No abrasions, lacerations, RLE externally rotated, thigh swelling.     PMH  Hypertension      PSH      Medications  Antimicrobial and Immunologic    Neuro, Psych and Analgesia      Cardiovascular and Hematologic (includes DVT ppx/AC/Antiplatelet agents)      Pulmonary    Oncologic    GI, Endocrine and Nutrition          Allergies/Insensitivities  Allergies    No Known Allergies    Intolerances        Social    Labs  CBC Basic (04-20 @ 15:38)  WBC: 7.71 Hgb: 13.3 Hct: 38.3 Plt: 212    Metabolic Panel (04-20 @ 15:38)  138  |  103  |  32  ----------------------------<  120  3.8   |  17  |  1.25  Ca: 10.2/Phos: x/Magnesium: x    Liver Chemistries (04-20 @ 15:38)  Total protein 7.4 | Albumin 4.4  TBili 0.5 | DBili x  AST 26 | ALT 19 | AlkPhos 51    Coagulation Studies (04-20 @ 15:38)  PT/INR: 10.8/0.95, aPTT: 51.9    Urinalysis (04-20 @ 15:38)  Physical: Color x, Appearance x, Mucous x, Gross blood x  Analytic: SG x, pH x  Cells: RBC x, WBC x  UTI markers: Bacteria x, Leukocyte esterase x, Nitrites x  Chemical: Glucose 120, Ketones x, Protein x, Urobilinogen x, Bilirubin x    Urinalysis Basic - ( 20 Apr 2025 15:38 )    Color: x / Appearance: x / SG: x / pH: x  Gluc: 120 mg/dL / Ketone: x  / Bili: x / Urobili: x   Blood: x / Protein: x / Nitrite: x   Leuk Esterase: x / RBC: x / WBC x   Sq Epi: x / Non Sq Epi: x / Bacteria: x    Imaging    CT BRAIN:    No acute intracranial hemorrhage, mass effect, or midline shift.    CT CERVICAL SPINE:    No acute fracture or traumatic subluxation.    Mild cervical spondylosis.    CT THORACIC SPINE:    No acute fracture or traumatic subluxation.    Mild thoracic spondylosis.    CT LUMBAR SPINE:    No acute fracture or traumatic subluxation.    CT C/A/P    IMPRESSION:  Partially visualized distal fragment right femoral fracture.    No additional traumatic injury    Xray R femur   IMPRESSION:  Acute displaced fracture of the right proximal femoral shaft.  
80-year-old female past medical history of hypertension presenting after fall.  Shortly prior to arrival patient was on a 10 foot ladder that was not secured leading her to fall.  She endorsed right hip pain but denied head strike or LOC.  No AC.  Patient was not able to get up and stand afterwards.  Patient denies other injuries.  Per EMS patient alert and oriented x 3 during transport, was given 50 x 2 of Fent by EMS.  EMS also noted a blood pressure in 180s systolic.   patient endorses right hip pain but denies other pain.  Patient denies fever, headache, vision changes, chest pain, shortness of breath, abdominal pain, nausea, vomiting. Patient was [facility] to [facility] for further evaluation and treatment. Patient was found to have a right femoral shaft fracture. Patient seen now s/p an ORIF of the right femoral shaft fracture. Patient tolerated the procedure well. Seen resting comfortably OOB sitting in a chair.       PAST MEDICAL & SURGICAL HISTORY:  Hypertension            MEDICATIONS  (STANDING):  acetaminophen     Tablet .. 975 milliGRAM(s) Oral every 8 hours  pantoprazole    Tablet 40 milliGRAM(s) Oral before breakfast  polyethylene glycol 3350 17 Gram(s) Oral at bedtime  rivaroxaban 10 milliGRAM(s) Oral daily  senna 2 Tablet(s) Oral at bedtime  sodium chloride 0.9%. 1000 milliLiter(s) (125 mL/Hr) IV Continuous <Continuous>    MEDICATIONS  (PRN):  magnesium hydroxide Suspension 30 milliLiter(s) Oral daily PRN Constipation  ondansetron Injectable 4 milliGRAM(s) IV Push every 6 hours PRN Nausea and/or Vomiting  oxyCODONE    IR 5 milliGRAM(s) Oral every 3 hours PRN Moderate Pain (4 - 6)  oxyCODONE    IR 10 milliGRAM(s) Oral every 3 hours PRN Severe Pain (7 - 10)  traMADol 50 milliGRAM(s) Oral every 6 hours PRN Mild Pain (1 - 3)    Social Hx:  Tobacco: occasionally  ETOH: wine with dinner  Drugs: Neg    Family Hx:  As per my conversation with the patient, non contributory     ROS  CONSTITUTIONAL: No weakness, fevers or chills  EYES/ENT: No visual changes;  No vertigo or throat pain   NECK: No pain or stiffness  RESPIRATORY: No cough, wheezing, hemoptysis; No shortness of breath  CARDIOVASCULAR: No chest pain or palpitations  GASTROINTESTINAL: No abdominal or epigastric pain. No nausea, vomiting, or hematemesis; No diarrhea or constipation. No melena or hematochezia.  GENITOURINARY: No dysuria, frequency or hematuria  NEUROLOGICAL: No numbness or weakness  SKIN: No itching, burning, rashes, or lesions   All other review of systems is negative unless indicated above.    INTERVAL HPI/OVERNIGHT EVENTS:  T(C): 36.7 (04-21-25 @ 13:02), Max: 37 (04-20-25 @ 23:15)  HR: 82 (04-21-25 @ 13:02) (78 - 115)  BP: 120/72 (04-21-25 @ 13:02) (92/54 - 245/112)  RR: 18 (04-21-25 @ 13:02) (16 - 27)  SpO2: 98% (04-21-25 @ 13:02) (94% - 100%)  Wt(kg): --  I&O's Summary    20 Apr 2025 07:01  -  21 Apr 2025 07:00  --------------------------------------------------------  IN: 350 mL / OUT: 350 mL / NET: 0 mL    21 Apr 2025 07:01  -  21 Apr 2025 14:42  --------------------------------------------------------  IN: 540 mL / OUT: 900 mL / NET: -360 mL        PHYSICAL EXAM:  GENERAL: NAD, well-groomed, well-developed  HEAD:  Atraumatic, Normocephalic  EYES: EOMI, PERRLA, conjunctiva and sclera clear  ENMT: No tonsillar erythema, exudates, or enlargement; Moist mucous membranes, Good dentition, No lesions  NECK: Supple, No JVD, Normal thyroid  NERVOUS SYSTEM:  Alert & Oriented X3, Good concentration; Motor Strength 5/5 B/L upper and lower extremities; DTRs 2+ intact and symmetric  CHEST/LUNG: Clear to percussion bilaterally; No rales, rhonchi, wheezing, or rubs  HEART: Regular rate and rhythm; No murmurs, rubs, or gallops  ABDOMEN: Soft, Nontender, Nondistended; Bowel sounds present  EXTREMITIES:  2+ Peripheral Pulses, No clubbing, cyanosis, or edema  LYMPH: No lymphadenopathy noted  SKIN: No rashes or lesions        LABS:                        10.3   7.77  )-----------( 199      ( 21 Apr 2025 02:57 )             31.3     04-21    137  |  104  |  27[H]  ----------------------------<  174[H]  4.5   |  20[L]  |  1.23    Ca    8.2[L]      21 Apr 2025 02:57    TPro  7.4  /  Alb  4.4  /  TBili  0.5  /  DBili  x   /  AST  26  /  ALT  19  /  AlkPhos  51  04-20    PT/INR - ( 20 Apr 2025 15:38 )   PT: 10.8 sec;   INR: 0.95 ratio         PTT - ( 20 Apr 2025 15:38 )  PTT:51.9 sec  Urinalysis Basic - ( 21 Apr 2025 02:57 )    Color: x / Appearance: x / SG: x / pH: x  Gluc: 174 mg/dL / Ketone: x  / Bili: x / Urobili: x   Blood: x / Protein: x / Nitrite: x   Leuk Esterase: x / RBC: x / WBC x   Sq Epi: x / Non Sq Epi: x / Bacteria: x      CAPILLARY BLOOD GLUCOSE            Urinalysis Basic - ( 21 Apr 2025 02:57 )    Color: x / Appearance: x / SG: x / pH: x  Gluc: 174 mg/dL / Ketone: x  / Bili: x / Urobili: x   Blood: x / Protein: x / Nitrite: x   Leuk Esterase: x / RBC: x / WBC x   Sq Epi: x / Non Sq Epi: x / Bacteria: x        Radiology reports:

## 2025-04-21 NOTE — OCCUPATIONAL THERAPY INITIAL EVALUATION ADULT - RANGE OF MOTION EXAMINATION, LOWER EXTREMITY
RLE not formally assessed 2* guarding, WFL through functional movement/Left LE Active ROM was WFL (within functional limits)

## 2025-04-21 NOTE — PHYSICAL THERAPY INITIAL EVALUATION ADULT - ADDITIONAL COMMENTS
as per pt, resides in a  with spouse, +5 stairs to enter with railings, one flight indoor to bedroom, PTA, pt amb (I), (I) with ADLs.

## 2025-04-21 NOTE — PATIENT PROFILE ADULT - FALL HARM RISK - HARM RISK INTERVENTIONS
Assistance with ambulation/Assistance OOB with selected safe patient handling equipment/Communicate Risk of Fall with Harm to all staff/Discuss with provider need for PT consult/Monitor gait and stability/Provide patient with walking aids - walker, cane, crutches/Reinforce activity limits and safety measures with patient and family/Sit up slowly, dangle for a short time, stand at bedside before walking/Tailored Fall Risk Interventions/Use of alarms - bed, chair and/or voice tab/Visual Cue: Yellow wristband and red socks/Bed in lowest position, wheels locked, appropriate side rails in place/Call bell, personal items and telephone in reach/Instruct patient to call for assistance before getting out of bed or chair/Non-slip footwear when patient is out of bed/Leonidas to call system/Physically safe environment - no spills, clutter or unnecessary equipment/Purposeful Proactive Rounding/Room/bathroom lighting operational, light cord in reach

## 2025-04-21 NOTE — DISCHARGE NOTE PROVIDER - CARE PROVIDER_API CALL
Kevin Howe  Orthopaedic Surgery  [address] [place] [address], [facility] [address]  [place] [address]  Phone: [phone]  Fax: [phone]  Follow Up Time:

## 2025-04-21 NOTE — CONSULT NOTE ADULT - ASSESSMENT
80yFemale w/ pmh of hypertension, anxiety c/o R hip pain s/p mechanical fall 5 feet from a ladder this afternoon. Unable to bear weight in the RLE since the fall. Found to have R femoral fracture. Going emergently to OR with Orthopedic surgery. Trauma surgery consulted for evaluation.       List of injuries:   - Acute displaced fx of R proximal femoral shaft     Recommendations:  - C collar cleared by confrontation   - Orthopedic surgery to proceed with planned surgery   - No acute surgical intervention from trauma surgery   - Rest of care per primary     Trauma Surgery   [id number]
81 yo woman presents after a fall off a ladder. Patient found to have a right femoral shaft fracture. Patient seen now s/p an Open reduction and internal fixation of the right femoral shft

## 2025-04-22 DIAGNOSIS — D62 ACUTE POSTHEMORRHAGIC ANEMIA: ICD-10-CM

## 2025-04-22 LAB
ANION GAP SERPL CALC-SCNC: 10 MMOL/L — SIGNIFICANT CHANGE UP (ref 5–17)
ANION GAP SERPL CALC-SCNC: 17 MMOL/L — SIGNIFICANT CHANGE UP (ref 5–17)
BUN SERPL-MCNC: 21 MG/DL — SIGNIFICANT CHANGE UP (ref 7–23)
BUN SERPL-MCNC: 22 MG/DL — SIGNIFICANT CHANGE UP (ref 7–23)
CALCIUM SERPL-MCNC: 8 MG/DL — LOW (ref 8.4–10.5)
CALCIUM SERPL-MCNC: <3 MG/DL — CRITICAL LOW (ref 8.4–10.5)
CHLORIDE SERPL-SCNC: 105 MMOL/L — SIGNIFICANT CHANGE UP (ref 96–108)
CHLORIDE SERPL-SCNC: 106 MMOL/L — SIGNIFICANT CHANGE UP (ref 96–108)
CO2 SERPL-SCNC: 18 MMOL/L — LOW (ref 22–31)
CO2 SERPL-SCNC: 22 MMOL/L — SIGNIFICANT CHANGE UP (ref 22–31)
CREAT SERPL-MCNC: 1.12 MG/DL — SIGNIFICANT CHANGE UP (ref 0.5–1.3)
CREAT SERPL-MCNC: 1.15 MG/DL — SIGNIFICANT CHANGE UP (ref 0.5–1.3)
EGFR: 48 ML/MIN/1.73M2 — LOW
EGFR: 48 ML/MIN/1.73M2 — LOW
EGFR: 50 ML/MIN/1.73M2 — LOW
EGFR: 50 ML/MIN/1.73M2 — LOW
GLUCOSE SERPL-MCNC: 129 MG/DL — HIGH (ref 70–99)
GLUCOSE SERPL-MCNC: 134 MG/DL — HIGH (ref 70–99)
HCT VFR BLD CALC: 20.8 % — CRITICAL LOW (ref 34.5–45)
HCT VFR BLD CALC: 21.8 % — LOW (ref 34.5–45)
HCT VFR BLD CALC: 28.9 % — LOW (ref 34.5–45)
HGB BLD-MCNC: 6.9 G/DL — CRITICAL LOW (ref 11.5–15.5)
HGB BLD-MCNC: 7.2 G/DL — LOW (ref 11.5–15.5)
HGB BLD-MCNC: 9.5 G/DL — LOW (ref 11.5–15.5)
MCHC RBC-ENTMCNC: 30 PG — SIGNIFICANT CHANGE UP (ref 27–34)
MCHC RBC-ENTMCNC: 30.4 PG — SIGNIFICANT CHANGE UP (ref 27–34)
MCHC RBC-ENTMCNC: 30.7 PG — SIGNIFICANT CHANGE UP (ref 27–34)
MCHC RBC-ENTMCNC: 32.9 G/DL — SIGNIFICANT CHANGE UP (ref 32–36)
MCHC RBC-ENTMCNC: 33 G/DL — SIGNIFICANT CHANGE UP (ref 32–36)
MCHC RBC-ENTMCNC: 33.2 G/DL — SIGNIFICANT CHANGE UP (ref 32–36)
MCV RBC AUTO: 90.8 FL — SIGNIFICANT CHANGE UP (ref 80–100)
MCV RBC AUTO: 92.3 FL — SIGNIFICANT CHANGE UP (ref 80–100)
MCV RBC AUTO: 92.4 FL — SIGNIFICANT CHANGE UP (ref 80–100)
NRBC BLD AUTO-RTO: 0 /100 WBCS — SIGNIFICANT CHANGE UP (ref 0–0)
PLATELET # BLD AUTO: 131 K/UL — LOW (ref 150–400)
PLATELET # BLD AUTO: 138 K/UL — LOW (ref 150–400)
PLATELET # BLD AUTO: 142 K/UL — LOW (ref 150–400)
POTASSIUM SERPL-MCNC: 4 MMOL/L — SIGNIFICANT CHANGE UP (ref 3.5–5.3)
POTASSIUM SERPL-MCNC: >9 MMOL/L — CRITICAL HIGH (ref 3.5–5.3)
POTASSIUM SERPL-SCNC: 4 MMOL/L — SIGNIFICANT CHANGE UP (ref 3.5–5.3)
POTASSIUM SERPL-SCNC: >9 MMOL/L — CRITICAL HIGH (ref 3.5–5.3)
RBC # BLD: 2.25 M/UL — LOW (ref 3.8–5.2)
RBC # BLD: 2.4 M/UL — LOW (ref 3.8–5.2)
RBC # BLD: 3.13 M/UL — LOW (ref 3.8–5.2)
RBC # FLD: 12.8 % — SIGNIFICANT CHANGE UP (ref 10.3–14.5)
RBC # FLD: 13 % — SIGNIFICANT CHANGE UP (ref 10.3–14.5)
RBC # FLD: 13.1 % — SIGNIFICANT CHANGE UP (ref 10.3–14.5)
SODIUM SERPL-SCNC: 138 MMOL/L — SIGNIFICANT CHANGE UP (ref 135–145)
SODIUM SERPL-SCNC: 140 MMOL/L — SIGNIFICANT CHANGE UP (ref 135–145)
WBC # BLD: 5.65 K/UL — SIGNIFICANT CHANGE UP (ref 3.8–10.5)
WBC # BLD: 5.72 K/UL — SIGNIFICANT CHANGE UP (ref 3.8–10.5)
WBC # BLD: 7.66 K/UL — SIGNIFICANT CHANGE UP (ref 3.8–10.5)
WBC # FLD AUTO: 5.65 K/UL — SIGNIFICANT CHANGE UP (ref 3.8–10.5)
WBC # FLD AUTO: 5.72 K/UL — SIGNIFICANT CHANGE UP (ref 3.8–10.5)
WBC # FLD AUTO: 7.66 K/UL — SIGNIFICANT CHANGE UP (ref 3.8–10.5)

## 2025-04-22 RX ORDER — SERTRALINE 100 MG/1
100 TABLET, FILM COATED ORAL DAILY
Refills: 0 | Status: DISCONTINUED | OUTPATIENT
Start: 2025-04-22 | End: 2025-04-25

## 2025-04-22 RX ORDER — SERTRALINE 100 MG/1
100 TABLET, FILM COATED ORAL ONCE
Refills: 0 | Status: COMPLETED | OUTPATIENT
Start: 2025-04-22 | End: 2025-04-22

## 2025-04-22 RX ADMIN — Medication 975 MILLIGRAM(S): at 06:00

## 2025-04-22 RX ADMIN — Medication 975 MILLIGRAM(S): at 13:05

## 2025-04-22 RX ADMIN — TRAMADOL HYDROCHLORIDE 50 MILLIGRAM(S): 50 TABLET, FILM COATED ORAL at 12:48

## 2025-04-22 RX ADMIN — OXYCODONE HYDROCHLORIDE 10 MILLIGRAM(S): 30 TABLET ORAL at 05:46

## 2025-04-22 RX ADMIN — Medication 80 MILLILITER(S): at 09:38

## 2025-04-22 RX ADMIN — Medication 975 MILLIGRAM(S): at 14:05

## 2025-04-22 RX ADMIN — Medication 975 MILLIGRAM(S): at 05:29

## 2025-04-22 RX ADMIN — POLYETHYLENE GLYCOL 3350 17 GRAM(S): 17 POWDER, FOR SOLUTION ORAL at 21:30

## 2025-04-22 RX ADMIN — Medication 975 MILLIGRAM(S): at 21:29

## 2025-04-22 RX ADMIN — SERTRALINE 100 MILLIGRAM(S): 100 TABLET, FILM COATED ORAL at 14:55

## 2025-04-22 RX ADMIN — TRAMADOL HYDROCHLORIDE 50 MILLIGRAM(S): 50 TABLET, FILM COATED ORAL at 11:48

## 2025-04-22 RX ADMIN — TRAMADOL HYDROCHLORIDE 50 MILLIGRAM(S): 50 TABLET, FILM COATED ORAL at 21:00

## 2025-04-22 RX ADMIN — OXYCODONE HYDROCHLORIDE 10 MILLIGRAM(S): 30 TABLET ORAL at 06:30

## 2025-04-22 RX ADMIN — Medication 40 MILLIGRAM(S): at 05:29

## 2025-04-22 RX ADMIN — Medication 2 TABLET(S): at 21:30

## 2025-04-22 RX ADMIN — RIVAROXABAN 10 MILLIGRAM(S): 10 TABLET, FILM COATED ORAL at 11:12

## 2025-04-22 RX ADMIN — Medication 500 MILLILITER(S): at 08:00

## 2025-04-22 RX ADMIN — Medication 975 MILLIGRAM(S): at 22:12

## 2025-04-22 RX ADMIN — TRAMADOL HYDROCHLORIDE 50 MILLIGRAM(S): 50 TABLET, FILM COATED ORAL at 20:19

## 2025-04-22 NOTE — PROVIDER CONTACT NOTE (CRITICAL VALUE NOTIFICATION) - ASSESSMENT
Pt A&Ox4, VSS, no c/o pain @ this time. No c/o chest pain, dizziness, SOB, or headache.
Hemoglobin 6.9; Hematocrit 20.8 reported from repeat AM labs. Pt A&Ox4, VSS except blood pressure soft. Pt denies any dizziness or pain at this time, trended in bed. Barber BUCHANAN assessed Pt at bedside, 500cc bolus given as ordered.

## 2025-04-22 NOTE — PROVIDER CONTACT NOTE (OTHER) - ACTION/TREATMENT ORDERED:
As per provider, fluid bolus to be ordered STAT. No further orders @ this time
Provider made aware. No orders at this time.
Provider made aware. Provider came to bedside to change R hip dressings. As per provider, okay to draw morning CBC and BMP labs now. No further orders at this time.

## 2025-04-22 NOTE — PROVIDER CONTACT NOTE (OTHER) - ASSESSMENT
Pt. is A&Ox4, VSS except hypotensive. NS running continuously @ 125cc/hr. Pt. is not c/o any pain, no chest pain, no SOB. Pt. denies feeling lightheaded or dizzy. R hip gauze + teg drsgs x4 is saturated in blood.
Pt is A&Ox4, all VSS except BP which was 88/56. No c/o discomfort or symptoms r/t hypotension
Pt. is A&Ox4, VSS except hypotensive. BP 99/61, HR 84, 95% on room air. Pt. is not c/o any pain, no chest pain, no SOB. Pt. denies feeling lightheaded or dizzy. NS is running continuously at 125cc/hr.

## 2025-04-22 NOTE — PROVIDER CONTACT NOTE (OTHER) - SITUATION
Pt. R hip dressing saturated.
Pt BP 99/61
Pt is A&Ox4, s/p R hip IMN. Pt was tachy during 0400 vitals, c/o pain. During shift change, symptoms improved but pt became hypotensive

## 2025-04-23 LAB
ANION GAP SERPL CALC-SCNC: 11 MMOL/L — SIGNIFICANT CHANGE UP (ref 5–17)
BLD GP AB SCN SERPL QL: NEGATIVE — SIGNIFICANT CHANGE UP
BUN SERPL-MCNC: 17 MG/DL — SIGNIFICANT CHANGE UP (ref 7–23)
CALCIUM SERPL-MCNC: 8 MG/DL — LOW (ref 8.4–10.5)
CHLORIDE SERPL-SCNC: 109 MMOL/L — HIGH (ref 96–108)
CO2 SERPL-SCNC: 20 MMOL/L — LOW (ref 22–31)
CREAT SERPL-MCNC: 1 MG/DL — SIGNIFICANT CHANGE UP (ref 0.5–1.3)
EGFR: 57 ML/MIN/1.73M2 — LOW
EGFR: 57 ML/MIN/1.73M2 — LOW
GLUCOSE SERPL-MCNC: 110 MG/DL — HIGH (ref 70–99)
HCT VFR BLD CALC: 24.1 % — LOW (ref 34.5–45)
HGB BLD-MCNC: 7.9 G/DL — LOW (ref 11.5–15.5)
MCHC RBC-ENTMCNC: 30.5 PG — SIGNIFICANT CHANGE UP (ref 27–34)
MCHC RBC-ENTMCNC: 32.8 G/DL — SIGNIFICANT CHANGE UP (ref 32–36)
MCV RBC AUTO: 93.1 FL — SIGNIFICANT CHANGE UP (ref 80–100)
NRBC BLD AUTO-RTO: 0 /100 WBCS — SIGNIFICANT CHANGE UP (ref 0–0)
PLATELET # BLD AUTO: 128 K/UL — LOW (ref 150–400)
POTASSIUM SERPL-MCNC: 4 MMOL/L — SIGNIFICANT CHANGE UP (ref 3.5–5.3)
POTASSIUM SERPL-SCNC: 4 MMOL/L — SIGNIFICANT CHANGE UP (ref 3.5–5.3)
RBC # BLD: 2.59 M/UL — LOW (ref 3.8–5.2)
RBC # FLD: 13.2 % — SIGNIFICANT CHANGE UP (ref 10.3–14.5)
RH IG SCN BLD-IMP: POSITIVE — SIGNIFICANT CHANGE UP
SODIUM SERPL-SCNC: 140 MMOL/L — SIGNIFICANT CHANGE UP (ref 135–145)
WBC # BLD: 6.11 K/UL — SIGNIFICANT CHANGE UP (ref 3.8–10.5)
WBC # FLD AUTO: 6.11 K/UL — SIGNIFICANT CHANGE UP (ref 3.8–10.5)

## 2025-04-23 RX ADMIN — Medication 975 MILLIGRAM(S): at 13:16

## 2025-04-23 RX ADMIN — Medication 2 TABLET(S): at 22:02

## 2025-04-23 RX ADMIN — TRAMADOL HYDROCHLORIDE 50 MILLIGRAM(S): 50 TABLET, FILM COATED ORAL at 11:57

## 2025-04-23 RX ADMIN — POLYETHYLENE GLYCOL 3350 17 GRAM(S): 17 POWDER, FOR SOLUTION ORAL at 22:03

## 2025-04-23 RX ADMIN — Medication 975 MILLIGRAM(S): at 05:59

## 2025-04-23 RX ADMIN — SERTRALINE 100 MILLIGRAM(S): 100 TABLET, FILM COATED ORAL at 11:57

## 2025-04-23 RX ADMIN — TRAMADOL HYDROCHLORIDE 50 MILLIGRAM(S): 50 TABLET, FILM COATED ORAL at 12:12

## 2025-04-23 RX ADMIN — Medication 975 MILLIGRAM(S): at 14:16

## 2025-04-23 RX ADMIN — RIVAROXABAN 10 MILLIGRAM(S): 10 TABLET, FILM COATED ORAL at 11:57

## 2025-04-23 RX ADMIN — Medication 80 MILLILITER(S): at 11:57

## 2025-04-23 RX ADMIN — Medication 975 MILLIGRAM(S): at 05:19

## 2025-04-23 RX ADMIN — Medication 975 MILLIGRAM(S): at 22:02

## 2025-04-23 RX ADMIN — Medication 40 MILLIGRAM(S): at 05:19

## 2025-04-23 RX ADMIN — Medication 975 MILLIGRAM(S): at 22:30

## 2025-04-24 LAB
ANION GAP SERPL CALC-SCNC: 10 MMOL/L — SIGNIFICANT CHANGE UP (ref 5–17)
BUN SERPL-MCNC: 15 MG/DL — SIGNIFICANT CHANGE UP (ref 7–23)
CALCIUM SERPL-MCNC: 8.3 MG/DL — LOW (ref 8.4–10.5)
CHLORIDE SERPL-SCNC: 110 MMOL/L — HIGH (ref 96–108)
CO2 SERPL-SCNC: 21 MMOL/L — LOW (ref 22–31)
CREAT SERPL-MCNC: 0.91 MG/DL — SIGNIFICANT CHANGE UP (ref 0.5–1.3)
EGFR: 64 ML/MIN/1.73M2 — SIGNIFICANT CHANGE UP
EGFR: 64 ML/MIN/1.73M2 — SIGNIFICANT CHANGE UP
GLUCOSE SERPL-MCNC: 113 MG/DL — HIGH (ref 70–99)
HCT VFR BLD CALC: 25.6 % — LOW (ref 34.5–45)
HGB BLD-MCNC: 8.6 G/DL — LOW (ref 11.5–15.5)
MCHC RBC-ENTMCNC: 29.7 PG — SIGNIFICANT CHANGE UP (ref 27–34)
MCHC RBC-ENTMCNC: 33.6 G/DL — SIGNIFICANT CHANGE UP (ref 32–36)
MCV RBC AUTO: 88.3 FL — SIGNIFICANT CHANGE UP (ref 80–100)
NRBC BLD AUTO-RTO: 0 /100 WBCS — SIGNIFICANT CHANGE UP (ref 0–0)
PLATELET # BLD AUTO: 139 K/UL — LOW (ref 150–400)
POTASSIUM SERPL-MCNC: 4 MMOL/L — SIGNIFICANT CHANGE UP (ref 3.5–5.3)
POTASSIUM SERPL-SCNC: 4 MMOL/L — SIGNIFICANT CHANGE UP (ref 3.5–5.3)
RBC # BLD: 2.9 M/UL — LOW (ref 3.8–5.2)
RBC # FLD: 14.7 % — HIGH (ref 10.3–14.5)
SODIUM SERPL-SCNC: 141 MMOL/L — SIGNIFICANT CHANGE UP (ref 135–145)
WBC # BLD: 5.76 K/UL — SIGNIFICANT CHANGE UP (ref 3.8–10.5)
WBC # FLD AUTO: 5.76 K/UL — SIGNIFICANT CHANGE UP (ref 3.8–10.5)

## 2025-04-24 RX ORDER — POLYETHYLENE GLYCOL 3350 17 G/17G
17 POWDER, FOR SOLUTION ORAL
Qty: 0 | Refills: 0 | DISCHARGE
Start: 2025-04-24

## 2025-04-24 RX ORDER — FERROUS SULFATE 137(45) MG
1 TABLET, EXTENDED RELEASE ORAL
Qty: 0 | Refills: 0 | DISCHARGE
Start: 2025-04-24

## 2025-04-24 RX ORDER — SENNA 187 MG
2 TABLET ORAL
Qty: 0 | Refills: 0 | DISCHARGE
Start: 2025-04-24

## 2025-04-24 RX ORDER — FERROUS SULFATE 137(45) MG
325 TABLET, EXTENDED RELEASE ORAL DAILY
Refills: 0 | Status: DISCONTINUED | OUTPATIENT
Start: 2025-04-24 | End: 2025-04-25

## 2025-04-24 RX ORDER — FOLIC ACID 1 MG/1
1 TABLET ORAL
Qty: 0 | Refills: 0 | DISCHARGE
Start: 2025-04-24

## 2025-04-24 RX ORDER — RIVAROXABAN 10 MG/1
1 TABLET, FILM COATED ORAL
Qty: 0 | Refills: 0 | DISCHARGE
Start: 2025-04-24

## 2025-04-24 RX ORDER — OXYCODONE HYDROCHLORIDE 30 MG/1
1 TABLET ORAL
Qty: 0 | Refills: 0 | DISCHARGE
Start: 2025-04-24

## 2025-04-24 RX ORDER — ACETAMINOPHEN 500 MG/5ML
3 LIQUID (ML) ORAL
Qty: 0 | Refills: 0 | DISCHARGE
Start: 2025-04-24

## 2025-04-24 RX ORDER — SERTRALINE 100 MG/1
1 TABLET, FILM COATED ORAL
Qty: 0 | Refills: 0 | DISCHARGE
Start: 2025-04-24

## 2025-04-24 RX ORDER — FOLIC ACID 1 MG/1
1 TABLET ORAL DAILY
Refills: 0 | Status: DISCONTINUED | OUTPATIENT
Start: 2025-04-24 | End: 2025-04-25

## 2025-04-24 RX ORDER — TRAMADOL HYDROCHLORIDE 50 MG/1
1 TABLET, FILM COATED ORAL
Qty: 0 | Refills: 0 | DISCHARGE
Start: 2025-04-24

## 2025-04-24 RX ADMIN — SERTRALINE 100 MILLIGRAM(S): 100 TABLET, FILM COATED ORAL at 11:22

## 2025-04-24 RX ADMIN — Medication 975 MILLIGRAM(S): at 13:08

## 2025-04-24 RX ADMIN — POLYETHYLENE GLYCOL 3350 17 GRAM(S): 17 POWDER, FOR SOLUTION ORAL at 21:30

## 2025-04-24 RX ADMIN — Medication 975 MILLIGRAM(S): at 06:07

## 2025-04-24 RX ADMIN — Medication 975 MILLIGRAM(S): at 05:54

## 2025-04-24 RX ADMIN — Medication 40 MILLIGRAM(S): at 05:54

## 2025-04-24 RX ADMIN — RIVAROXABAN 10 MILLIGRAM(S): 10 TABLET, FILM COATED ORAL at 11:22

## 2025-04-24 RX ADMIN — OXYCODONE HYDROCHLORIDE 10 MILLIGRAM(S): 30 TABLET ORAL at 11:51

## 2025-04-24 RX ADMIN — Medication 325 MILLIGRAM(S): at 11:22

## 2025-04-24 RX ADMIN — Medication 975 MILLIGRAM(S): at 21:29

## 2025-04-24 RX ADMIN — Medication 2 TABLET(S): at 21:29

## 2025-04-24 RX ADMIN — OXYCODONE HYDROCHLORIDE 10 MILLIGRAM(S): 30 TABLET ORAL at 11:21

## 2025-04-24 RX ADMIN — FOLIC ACID 1 MILLIGRAM(S): 1 TABLET ORAL at 11:23

## 2025-04-24 RX ADMIN — Medication 975 MILLIGRAM(S): at 13:31

## 2025-04-25 VITALS
HEART RATE: 86 BPM | DIASTOLIC BLOOD PRESSURE: 62 MMHG | SYSTOLIC BLOOD PRESSURE: 118 MMHG | RESPIRATION RATE: 18 BRPM | OXYGEN SATURATION: 100 % | TEMPERATURE: 98 F

## 2025-04-25 LAB
ANION GAP SERPL CALC-SCNC: 12 MMOL/L — SIGNIFICANT CHANGE UP (ref 5–17)
BUN SERPL-MCNC: 17 MG/DL — SIGNIFICANT CHANGE UP (ref 7–23)
CALCIUM SERPL-MCNC: 8.4 MG/DL — SIGNIFICANT CHANGE UP (ref 8.4–10.5)
CHLORIDE SERPL-SCNC: 108 MMOL/L — SIGNIFICANT CHANGE UP (ref 96–108)
CO2 SERPL-SCNC: 21 MMOL/L — LOW (ref 22–31)
CREAT SERPL-MCNC: 0.96 MG/DL — SIGNIFICANT CHANGE UP (ref 0.5–1.3)
EGFR: 60 ML/MIN/1.73M2 — SIGNIFICANT CHANGE UP
EGFR: 60 ML/MIN/1.73M2 — SIGNIFICANT CHANGE UP
GLUCOSE SERPL-MCNC: 106 MG/DL — HIGH (ref 70–99)
HCT VFR BLD CALC: 24.2 % — LOW (ref 34.5–45)
HGB BLD-MCNC: 8.3 G/DL — LOW (ref 11.5–15.5)
MCHC RBC-ENTMCNC: 30.6 PG — SIGNIFICANT CHANGE UP (ref 27–34)
MCHC RBC-ENTMCNC: 34.3 G/DL — SIGNIFICANT CHANGE UP (ref 32–36)
MCV RBC AUTO: 89.3 FL — SIGNIFICANT CHANGE UP (ref 80–100)
NRBC BLD AUTO-RTO: 0 /100 WBCS — SIGNIFICANT CHANGE UP (ref 0–0)
PLATELET # BLD AUTO: 159 K/UL — SIGNIFICANT CHANGE UP (ref 150–400)
POTASSIUM SERPL-MCNC: 4.2 MMOL/L — SIGNIFICANT CHANGE UP (ref 3.5–5.3)
POTASSIUM SERPL-SCNC: 4.2 MMOL/L — SIGNIFICANT CHANGE UP (ref 3.5–5.3)
RBC # BLD: 2.71 M/UL — LOW (ref 3.8–5.2)
RBC # FLD: 14.4 % — SIGNIFICANT CHANGE UP (ref 10.3–14.5)
SODIUM SERPL-SCNC: 141 MMOL/L — SIGNIFICANT CHANGE UP (ref 135–145)
WBC # BLD: 5.01 K/UL — SIGNIFICANT CHANGE UP (ref 3.8–10.5)
WBC # FLD AUTO: 5.01 K/UL — SIGNIFICANT CHANGE UP (ref 3.8–10.5)

## 2025-04-25 RX ADMIN — RIVAROXABAN 10 MILLIGRAM(S): 10 TABLET, FILM COATED ORAL at 12:00

## 2025-04-25 RX ADMIN — FOLIC ACID 1 MILLIGRAM(S): 1 TABLET ORAL at 12:01

## 2025-04-25 RX ADMIN — Medication 325 MILLIGRAM(S): at 12:01

## 2025-04-25 RX ADMIN — Medication 975 MILLIGRAM(S): at 13:54

## 2025-04-25 RX ADMIN — Medication 40 MILLIGRAM(S): at 05:01

## 2025-04-25 RX ADMIN — OXYCODONE HYDROCHLORIDE 10 MILLIGRAM(S): 30 TABLET ORAL at 11:29

## 2025-04-25 RX ADMIN — OXYCODONE HYDROCHLORIDE 10 MILLIGRAM(S): 30 TABLET ORAL at 10:49

## 2025-04-25 RX ADMIN — Medication 975 MILLIGRAM(S): at 14:24

## 2025-04-25 RX ADMIN — Medication 975 MILLIGRAM(S): at 05:01

## 2025-04-25 RX ADMIN — SERTRALINE 100 MILLIGRAM(S): 100 TABLET, FILM COATED ORAL at 12:01

## 2025-04-25 NOTE — PROGRESS NOTE ADULT - PROBLEM SELECTOR PLAN 4
Patient found to be anemic   received 2 units PRBC  follow H&H as out pt   supplement iron as needed
Patient found to be anemic   continue to monitor H&H  Transfuse as needed
Patient found to be anemic   continue to monitor H&H  Transfuse as needed
Patient found to be anemic and received a unit of PRBC  continue to monitor H&H  Transfuse as needed

## 2025-04-25 NOTE — PROGRESS NOTE ADULT - SUBJECTIVE AND OBJECTIVE BOX
Orthopedic Surgery      Pt seen and examined. Pt denies any overnight events. Pt reports pain is well controlled. Pt denies any fever/chills/chest pain/nausea/vomiting.      ICU Vital Signs Last 24 Hrs  T(C): 36.4 (21 Apr 2025 07:32), Max: 37 (20 Apr 2025 23:15)  T(F): 97.6 (21 Apr 2025 07:32), Max: 98.6 (20 Apr 2025 23:15)  HR: 84 (21 Apr 2025 07:32) (78 - 115)  BP: 102/65 (21 Apr 2025 07:32) (92/54 - 245/112)  BP(mean): 72 (20 Apr 2025 22:51) (72 - 93)  ABP: --  ABP(mean): --  RR: 18 (21 Apr 2025 07:32) (16 - 27)  SpO2: 97% (21 Apr 2025 07:32) (94% - 100%)          Physical exam:   Gen: NAD, alert and oriented  Resp: Unlabored breathing  RLE: Skin intact, no ecchymosis,   Dressing clean dry intact       SILT DP/SP/ Leann/Saph/tib       +IP/Quad/EHL/FHL/TA/Gastroc,        DP+,        soft compartments, no calf ttp     LABS:                        10.3   7.77  )-----------( 199      ( 21 Apr 2025 02:57 )             31.3     04-21    137  |  104  |  27[H]  ----------------------------<  174[H]  4.5   |  20[L]  |  1.23    Ca    8.2[L]      21 Apr 2025 02:57    TPro  7.4  /  Alb  4.4  /  TBili  0.5  /  DBili  x   /  AST  26  /  ALT  19  /  AlkPhos  51  04-20    PT/INR - ( 20 Apr 2025 15:38 )   PT: 10.8 sec;   INR: 0.95 ratio         PTT - ( 20 Apr 2025 15:38 )  PTT:51.9 sec  Urinalysis Basic - ( 21 Apr 2025 02:57 )    Color: x / Appearance: x / SG: x / pH: x  Gluc: 174 mg/dL / Ketone: x  / Bili: x / Urobili: x   Blood: x / Protein: x / Nitrite: x   Leuk Esterase: x / RBC: x / WBC x   Sq Epi: x / Non Sq Epi: x / Bacteria: x              Assessment/Plan:   Patient is a 80y old Female s/p right femoral shaft IMN 4/20/25. Recovering well   WBAT RLE  PT/OT  Multimodal analgesia  DVT ppt: Xarelto.  Dispo: Pending PT owen Jamil PGY-2    For any questions please contact the on call orthopedic surgery team, please do not reach out via teams.  [facility] pager: [phone]  [facility] pager: [phone]  [facility] pager: [phone]
Patient seen and examined at bedside. Pain is controlled. Pt feeling well. No nausea or vomiting.    Vital Signs Last 24 Hrs  T(C): 36.7 (04-23-25 @ 05:11), Max: 37.5 (04-22-25 @ 16:25)  T(F): 98.1 (04-23-25 @ 05:11), Max: 99.5 (04-22-25 @ 16:25)  HR: 89 (04-23-25 @ 05:11) (84 - 98)  BP: 108/67 (04-23-25 @ 05:11) (88/56 - 135/64)  BP(mean): --  RR: 18 (04-23-25 @ 05:11) (18 - 18)  SpO2: 100% (04-23-25 @ 05:11) (95% - 100%)          Exam:  Gen: NAD, resting comfortably  RLE  Dressing c/d/i, superior incision with mild spotting on 4x4  +EHL/FHL/TA/GS  SILT DP/SP/saph/sural  +DP/PT 2+  Calf NTTP b/l  Compartments soft and compressible    A/P:  80yFemale Stable POD3 R femoral shaft IMN    -FU labs s/p 1 unit given 4/22  -WBAT  -Pain control  -PT/OT  -Ppx ABX  -DVT PE ppx- xarelto  -Incentive spirometry  -Dispo TARUN
Surgery Progress Note    S: Patient seen and examined. OR overnight with Ortho for R femur fracture. Having pain at that site this AM, no other complaints.    O:  Tertiary Exam:  Gen: Laying in bed, NAD  HEENT: Atraumatic, non-tender, no injuries  Resp: Unlabored breathing, room air  Abd: soft, non-tender, non-distended  Groin: Normal appearing  Ext: Dressing over RLE clean, tenderness over R hip/thigh; able to move toes, sensation intact; L knee with small skin abrasion, nontender, ROM intact  Back: Non-tender, no stepoffs to C/T/L spines    Vital Signs Last 24 Hrs  T(C): 36.4 (21 Apr 2025 07:32), Max: 37 (20 Apr 2025 23:15)  T(F): 97.6 (21 Apr 2025 07:32), Max: 98.6 (20 Apr 2025 23:15)  HR: 86 (21 Apr 2025 09:04) (78 - 115)  BP: 98/56 (21 Apr 2025 09:04) (92/54 - 245/112)  BP(mean): 72 (20 Apr 2025 22:51) (72 - 93)  RR: 18 (21 Apr 2025 09:04) (16 - 27)  SpO2: 97% (21 Apr 2025 09:04) (94% - 100%)    Parameters below as of 21 Apr 2025 09:04  Patient On (Oxygen Delivery Method): room air        I&O's Detail    20 Apr 2025 07:01  -  21 Apr 2025 07:00  --------------------------------------------------------  IN:    IV PiggyBack: 100 mL    sodium chloride 0.9%: 250 mL  Total IN: 350 mL    OUT:    Indwelling Catheter - Urethral (mL): 350 mL  Total OUT: 350 mL    Total NET: 0 mL      21 Apr 2025 07:01  -  21 Apr 2025 09:58  --------------------------------------------------------  IN:    Oral Fluid: 300 mL  Total IN: 300 mL    OUT:    Indwelling Catheter - Urethral (mL): 300 mL  Total OUT: 300 mL    Total NET: 0 mL                                10.3   7.77  )-----------( 199      ( 21 Apr 2025 02:57 )             31.3       04-21    137  |  104  |  27[H]  ----------------------------<  174[H]  4.5   |  20[L]  |  1.23    Ca    8.2[L]      21 Apr 2025 02:57    TPro  7.4  /  Alb  4.4  /  TBili  0.5  /  DBili  x   /  AST  26  /  ALT  19  /  AlkPhos  51  04-20      
80-year-old female past medical history of hypertension presenting after fall.  Shortly prior to arrival patient was on a 10 foot ladder that was not secured leading her to fall.  She endorsed right hip pain but denied head strike or LOC.  No AC.  Patient was not able to get up and stand afterwards.  Patient denies other injuries.  Per EMS patient alert and oriented x 3 during transport, was given 50 x 2 of Fent by EMS.  EMS also noted a blood pressure in 180s systolic.   patient endorses right hip pain but denies other pain.  Patient denies fever, headache, vision changes, chest pain, shortness of breath, abdominal pain, nausea, vomiting. Patient was brought to [facility] for further evaluation and treatment. Patient was found to have a right femoral shaft fracture. Patient seen now s/p an ORIF of the right femoral shaft fracture. Patient tolerated the procedure well. Seen resting comfortably OOB sitting in a chair. Patient has started working with physical therapy. continue complaint of pain at the surgical site. Patient has started working with physical therapy. States pain has been well managed.       MEDICATIONS  (STANDING):  acetaminophen     Tablet .. 975 milliGRAM(s) Oral every 8 hours  ferrous    sulfate 325 milliGRAM(s) Oral daily  folic acid 1 milliGRAM(s) Oral daily  pantoprazole    Tablet 40 milliGRAM(s) Oral before breakfast  polyethylene glycol 3350 17 Gram(s) Oral at bedtime  rivaroxaban 10 milliGRAM(s) Oral daily  senna 2 Tablet(s) Oral at bedtime  sertraline 100 milliGRAM(s) Oral daily  sodium chloride 0.9%. 1000 milliLiter(s) (80 mL/Hr) IV Continuous <Continuous>  sodium chloride 0.9%. 1000 milliLiter(s) (125 mL/Hr) IV Continuous <Continuous>    MEDICATIONS  (PRN):  magnesium hydroxide Suspension 30 milliLiter(s) Oral daily PRN Constipation  ondansetron Injectable 4 milliGRAM(s) IV Push every 6 hours PRN Nausea and/or Vomiting  oxyCODONE    IR 5 milliGRAM(s) Oral every 3 hours PRN Moderate Pain (4 - 6)  oxyCODONE    IR 10 milliGRAM(s) Oral every 3 hours PRN Severe Pain (7 - 10)  traMADol 50 milliGRAM(s) Oral every 6 hours PRN Mild Pain (1 - 3)          VITALS:   T(C): 36.7 (04-24-25 @ 12:18), Max: 36.8 (04-23-25 @ 20:38)  HR: 76 (04-24-25 @ 12:18) (75 - 95)  BP: 127/58 (04-24-25 @ 12:18) (108/64 - 155/64)  RR: 18 (04-24-25 @ 12:18) (18 - 18)  SpO2: 98% (04-24-25 @ 12:18) (96% - 99%)  Wt(kg): --    PHYSICAL EXAM:  GENERAL: NAD, well-groomed, well-developed  HEAD:  Atraumatic, Normocephalic  EYES: EOMI, PERRLA, conjunctiva and sclera clear  ENMT: No tonsillar erythema, exudates, or enlargement; Moist mucous membranes, Good dentition, No lesions  NECK: Supple, No JVD, Normal thyroid  NERVOUS SYSTEM:  Alert & Oriented X3, Good concentration; Motor Strength 5/5 B/L upper and lower extremities; DTRs 2+ intact and symmetric  CHEST/LUNG: Clear to percussion bilaterally; No rales, rhonchi, wheezing, or rubs  HEART: Regular rate and rhythm; No murmurs, rubs, or gallops  ABDOMEN: Soft, Nontender, Nondistended; Bowel sounds present  EXTREMITIES:  2+ Peripheral Pulses, No clubbing, cyanosis, or edema  LYMPH: No lymphadenopathy noted  SKIN: No rashes or lesions    LABS:        CBC Full  -  ( 24 Apr 2025 06:17 )  WBC Count : 5.76 K/uL  RBC Count : 2.90 M/uL  Hemoglobin : 8.6 g/dL  Hematocrit : 25.6 %  Platelet Count - Automated : 139 K/uL  Mean Cell Volume : 88.3 fl  Mean Cell Hemoglobin : 29.7 pg  Mean Cell Hemoglobin Concentration : 33.6 g/dL  Auto Neutrophil # : x  Auto Lymphocyte # : x  Auto Monocyte # : x  Auto Eosinophil # : x  Auto Basophil # : x  Auto Neutrophil % : x  Auto Lymphocyte % : x  Auto Monocyte % : x  Auto Eosinophil % : x  Auto Basophil % : x    04-24    141  |  110[H]  |  15  ----------------------------<  113[H]  4.0   |  21[L]  |  0.91    Ca    8.3[L]      24 Apr 2025 06:16          Urinalysis Basic - ( 24 Apr 2025 06:16 )    Color: x / Appearance: x / SG: x / pH: x  Gluc: 113 mg/dL / Ketone: x  / Bili: x / Urobili: x   Blood: x / Protein: x / Nitrite: x   Leuk Esterase: x / RBC: x / WBC x   Sq Epi: x / Non Sq Epi: x / Bacteria: x      CAPILLARY BLOOD GLUCOSE          RADIOLOGY & ADDITIONAL TESTS:      
ORTHOPAEDIC PROGRESS NOTE    SUBJECTIVE:  Pt seen and examined at bedside this am.  Doing well.  No acute events overnight.  Pt states pain is well controlled    OBJECTIVE:  Vital Signs Last 24 Hrs  T(C): 36.7 (24 Apr 2025 04:29), Max: 36.8 (23 Apr 2025 08:12)  T(F): 98 (24 Apr 2025 04:29), Max: 98.3 (23 Apr 2025 20:38)  HR: 81 (24 Apr 2025 04:29) (76 - 95)  BP: 113/66 (24 Apr 2025 04:29) (108/64 - 155/64)  BP(mean): --  RR: 18 (24 Apr 2025 04:29) (18 - 18)  SpO2: 99% (24 Apr 2025 04:29) (96% - 100%)    Parameters below as of 24 Apr 2025 04:29  Patient On (Oxygen Delivery Method): room air        Physical Exam:  General: NAD; resting comfrotably in bed  Resp: non labored  RLE  Dressing in place. Small strikethrough at proximal most dressing.  +EHL/FHL/TA/GS  SILT DP/SP/saph/sural  +DP/PT 2+  Calf NTTP b/l  Compartments soft and compressible      LABS                        7.9    6.11  )-----------( 128      ( 23 Apr 2025 06:27 )             24.1       04-23    140  |  109[H]  |  17  ----------------------------<  110[H]  4.0   |  20[L]  |  1.00    Ca    8.0[L]      23 Apr 2025 06:28            I&O's Summary    22 Apr 2025 07:01  -  23 Apr 2025 07:00  --------------------------------------------------------  IN: 980 mL / OUT: 300 mL / NET: 680 mL    23 Apr 2025 07:01  -  24 Apr 2025 06:16  --------------------------------------------------------  IN: 900 mL / OUT: 1500 mL / NET: -600 mL            
Orthopedic Progress Note    Patient is status post right femur IMN, POD # 5  Patient seen and examined at bedside.    Vital Signs Last 24 Hrs  T(C): 36.3 (25 Apr 2025 05:05), Max: 36.7 (24 Apr 2025 08:00)  T(F): 97.4 (25 Apr 2025 05:05), Max: 98 (24 Apr 2025 08:00)  HR: 89 (25 Apr 2025 05:05) (75 - 89)  BP: 149/73 (25 Apr 2025 05:05) (113/53 - 151/72)  BP(mean): --  RR: 18 (25 Apr 2025 05:05) (18 - 18)  SpO2: 100% (25 Apr 2025 05:05) (98% - 100%)    Parameters below as of 25 Apr 2025 05:05  Patient On (Oxygen Delivery Method): room air    Exam:  Gen: No acute distress  RLE: Dressings x4 clean, dry, and intact  Motor intact EHL/FHL/TA/GS  SILT in the distal extremity  + DP pulse  BLE: Calves soft and nontender
Patient seen and examined at bedside. In some pain this am but due for oxycodone which was being administered by nurse who was bedside.     Vital Signs Last 24 Hrs  T(C): 36.9 (04-22-25 @ 05:28), Max: 36.9 (04-21-25 @ 19:55)  T(F): 98.5 (04-22-25 @ 05:28), Max: 98.5 (04-21-25 @ 19:55)  HR: 112 (04-22-25 @ 05:28) (82 - 112)  BP: 119/63 (04-22-25 @ 05:28) (98/56 - 160/61)  BP(mean): --  RR: 18 (04-22-25 @ 05:28) (18 - 18)  SpO2: 100% (04-22-25 @ 05:28) (97% - 100%)      PT/INR - ( 20 Apr 2025 15:38 )   PT: 10.8 sec;   INR: 0.95 ratio         PTT - ( 20 Apr 2025 15:38 )  PTT:51.9 sec    Exam:  Gen: NAD, resting comfortably  RLE  Dressing c/d/i  +EHL/FHL/TA/GS  SILT dp/sp/saph/sural  +DP/PT 2+  Calf NTTP b/l  Compartments soft and compressible    A/P:  80yFemale Stable POD2 R IMN for femoral shaft fracture     -FU labs  -WBAT  -Pain control  -PT/OT  -Ppx ABX  -DVT PE ppx- xarelto  -Incentive spirometry  -TARUN, stable for discharge from orthopedic standpoint
80-year-old female past medical history of hypertension presenting after fall.  Shortly prior to arrival patient was on a 10 foot ladder that was not secured leading her to fall.  She endorsed right hip pain but denied head strike or LOC.  No AC.  Patient was not able to get up and stand afterwards.  Patient denies other injuries.  Per EMS patient alert and oriented x 3 during transport, was given 50 x 2 of Fent by EMS.  EMS also noted a blood pressure in 180s systolic.   patient endorses right hip pain but denies other pain.  Patient denies fever, headache, vision changes, chest pain, shortness of breath, abdominal pain, nausea, vomiting. Patient was brought to [facility] for further evaluation and treatment. Patient was found to have a right femoral shaft fracture. Patient seen now s/p an ORIF of the right femoral shaft fracture. Patient tolerated the procedure well. Seen resting comfortably OOB sitting in a chair. Patient has started working with physical therapy. continue complaint of pain at the surgical site. Patient has started working with physical therapy. States pain has been well managed. Patient has been working with physical therapy and is scheduled for DC to rehab      MEDICATIONS  (STANDING):  acetaminophen     Tablet .. 975 milliGRAM(s) Oral every 8 hours  ferrous    sulfate 325 milliGRAM(s) Oral daily  folic acid 1 milliGRAM(s) Oral daily  pantoprazole    Tablet 40 milliGRAM(s) Oral before breakfast  polyethylene glycol 3350 17 Gram(s) Oral at bedtime  rivaroxaban 10 milliGRAM(s) Oral daily  senna 2 Tablet(s) Oral at bedtime  sertraline 100 milliGRAM(s) Oral daily  sodium chloride 0.9%. 1000 milliLiter(s) (80 mL/Hr) IV Continuous <Continuous>  sodium chloride 0.9%. 1000 milliLiter(s) (125 mL/Hr) IV Continuous <Continuous>    MEDICATIONS  (PRN):  magnesium hydroxide Suspension 30 milliLiter(s) Oral daily PRN Constipation  ondansetron Injectable 4 milliGRAM(s) IV Push every 6 hours PRN Nausea and/or Vomiting  oxyCODONE    IR 10 milliGRAM(s) Oral every 3 hours PRN Severe Pain (7 - 10)  oxyCODONE    IR 5 milliGRAM(s) Oral every 3 hours PRN Moderate Pain (4 - 6)  traMADol 50 milliGRAM(s) Oral every 6 hours PRN Mild Pain (1 - 3)          VITALS:   T(C): 36.7 (04-25-25 @ 08:20), Max: 36.7 (04-25-25 @ 08:20)  HR: 86 (04-25-25 @ 08:20) (86 - 89)  BP: 118/62 (04-25-25 @ 08:20) (118/62 - 151/72)  RR: 18 (04-25-25 @ 08:20) (18 - 18)  SpO2: 100% (04-25-25 @ 08:20) (99% - 100%)  Wt(kg): --    PHYSICAL EXAM:  GENERAL: NAD, well-groomed, well-developed  HEAD:  Atraumatic, Normocephalic  EYES: EOMI, PERRLA, conjunctiva and sclera clear  ENMT: No tonsillar erythema, exudates, or enlargement; Moist mucous membranes, Good dentition, No lesions  NECK: Supple, No JVD, Normal thyroid  NERVOUS SYSTEM:  Alert & Oriented X3, Good concentration; Motor Strength 5/5 B/L upper and lower extremities; DTRs 2+ intact and symmetric  CHEST/LUNG: Clear to percussion bilaterally; No rales, rhonchi, wheezing, or rubs  HEART: Regular rate and rhythm; No murmurs, rubs, or gallops  ABDOMEN: Soft, Nontender, Nondistended; Bowel sounds present  EXTREMITIES:  2+ Peripheral Pulses, No clubbing, cyanosis, or edema  LYMPH: No lymphadenopathy noted  SKIN: No rashes or lesions    LABS:        CBC Full  -  ( 25 Apr 2025 07:25 )  WBC Count : 5.01 K/uL  RBC Count : 2.71 M/uL  Hemoglobin : 8.3 g/dL  Hematocrit : 24.2 %  Platelet Count - Automated : 159 K/uL  Mean Cell Volume : 89.3 fl  Mean Cell Hemoglobin : 30.6 pg  Mean Cell Hemoglobin Concentration : 34.3 g/dL  Auto Neutrophil # : x  Auto Lymphocyte # : x  Auto Monocyte # : x  Auto Eosinophil # : x  Auto Basophil # : x  Auto Neutrophil % : x  Auto Lymphocyte % : x  Auto Monocyte % : x  Auto Eosinophil % : x  Auto Basophil % : x    04-25    141  |  108  |  17  ----------------------------<  106[H]  4.2   |  21[L]  |  0.96    Ca    8.4      25 Apr 2025 07:25          Urinalysis Basic - ( 25 Apr 2025 07:25 )    Color: x / Appearance: x / SG: x / pH: x  Gluc: 106 mg/dL / Ketone: x  / Bili: x / Urobili: x   Blood: x / Protein: x / Nitrite: x   Leuk Esterase: x / RBC: x / WBC x   Sq Epi: x / Non Sq Epi: x / Bacteria: x      CAPILLARY BLOOD GLUCOSE          RADIOLOGY & ADDITIONAL TESTS:      
80-year-old female past medical history of hypertension presenting after fall.  Shortly prior to arrival patient was on a 10 foot ladder that was not secured leading her to fall.  She endorsed right hip pain but denied head strike or LOC.  No AC.  Patient was not able to get up and stand afterwards.  Patient denies other injuries.  Per EMS patient alert and oriented x 3 during transport, was given 50 x 2 of Fent by EMS.  EMS also noted a blood pressure in 180s systolic.   patient endorses right hip pain but denies other pain.  Patient denies fever, headache, vision changes, chest pain, shortness of breath, abdominal pain, nausea, vomiting. Patient was brought to [facility] for further evaluation and treatment. Patient was found to have a right femoral shaft fracture. Patient seen now s/p an ORIF of the right femoral shaft fracture. Patient tolerated the procedure well. Seen resting comfortably OOB sitting in a chair. Patient has started working with physical therapy. continue complaint of pain at the surgical site     MEDICATIONS  (STANDING):  acetaminophen     Tablet .. 975 milliGRAM(s) Oral every 8 hours  pantoprazole    Tablet 40 milliGRAM(s) Oral before breakfast  polyethylene glycol 3350 17 Gram(s) Oral at bedtime  rivaroxaban 10 milliGRAM(s) Oral daily  senna 2 Tablet(s) Oral at bedtime  sodium chloride 0.9%. 1000 milliLiter(s) (80 mL/Hr) IV Continuous <Continuous>  sodium chloride 0.9%. 1000 milliLiter(s) (125 mL/Hr) IV Continuous <Continuous>    MEDICATIONS  (PRN):  magnesium hydroxide Suspension 30 milliLiter(s) Oral daily PRN Constipation  ondansetron Injectable 4 milliGRAM(s) IV Push every 6 hours PRN Nausea and/or Vomiting  oxyCODONE    IR 5 milliGRAM(s) Oral every 3 hours PRN Moderate Pain (4 - 6)  oxyCODONE    IR 10 milliGRAM(s) Oral every 3 hours PRN Severe Pain (7 - 10)  traMADol 50 milliGRAM(s) Oral every 6 hours PRN Mild Pain (1 - 3)          VITALS:   T(C): 36.9 (04-22-25 @ 11:58), Max: 36.9 (04-21-25 @ 19:55)  HR: 84 (04-22-25 @ 11:58) (82 - 112)  BP: 121/75 (04-22-25 @ 11:58) (88/56 - 160/61)  RR: 18 (04-22-25 @ 11:58) (18 - 18)  SpO2: 97% (04-22-25 @ 11:58) (95% - 100%)  Wt(kg): --      PHYSICAL EXAM:  GENERAL: NAD, well-groomed, well-developed  HEAD:  Atraumatic, Normocephalic  EYES: EOMI, PERRLA, conjunctiva and sclera clear  ENMT: No tonsillar erythema, exudates, or enlargement; Moist mucous membranes, Good dentition, No lesions  NECK: Supple, No JVD, Normal thyroid  NERVOUS SYSTEM:  Alert & Oriented X3, Good concentration; Motor Strength 5/5 B/L upper and lower extremities; DTRs 2+ intact and symmetric  CHEST/LUNG: Clear to percussion bilaterally; No rales, rhonchi, wheezing, or rubs  HEART: Regular rate and rhythm; No murmurs, rubs, or gallops  ABDOMEN: Soft, Nontender, Nondistended; Bowel sounds present  EXTREMITIES:  2+ Peripheral Pulses, No clubbing, cyanosis, or edema  LYMPH: No lymphadenopathy noted  SKIN: No rashes or lesions    LABS:        CBC Full  -  ( 22 Apr 2025 06:58 )  WBC Count : 5.65 K/uL  RBC Count : 2.25 M/uL  Hemoglobin : 6.9 g/dL  Hematocrit : 20.8 %  Platelet Count - Automated : 131 K/uL  Mean Cell Volume : 92.4 fl  Mean Cell Hemoglobin : 30.7 pg  Mean Cell Hemoglobin Concentration : 33.2 g/dL  Auto Neutrophil # : x  Auto Lymphocyte # : x  Auto Monocyte # : x  Auto Eosinophil # : x  Auto Basophil # : x  Auto Neutrophil % : x  Auto Lymphocyte % : x  Auto Monocyte % : x  Auto Eosinophil % : x  Auto Basophil % : x    04-22    138  |  106  |  22  ----------------------------<  134[H]  4.0   |  22  |  1.12    Ca    8.0[L]      22 Apr 2025 06:58    TPro  7.4  /  Alb  4.4  /  TBili  0.5  /  DBili  x   /  AST  26  /  ALT  19  /  AlkPhos  51  04-20    LIVER FUNCTIONS - ( 20 Apr 2025 15:38 )  Alb: 4.4 g/dL / Pro: 7.4 g/dL / ALK PHOS: 51 U/L / ALT: 19 U/L / AST: 26 U/L / GGT: x           PT/INR - ( 20 Apr 2025 15:38 )   PT: 10.8 sec;   INR: 0.95 ratio         PTT - ( 20 Apr 2025 15:38 )  PTT:51.9 sec  Urinalysis Basic - ( 22 Apr 2025 06:58 )    Color: x / Appearance: x / SG: x / pH: x  Gluc: 134 mg/dL / Ketone: x  / Bili: x / Urobili: x   Blood: x / Protein: x / Nitrite: x   Leuk Esterase: x / RBC: x / WBC x   Sq Epi: x / Non Sq Epi: x / Bacteria: x      CAPILLARY BLOOD GLUCOSE          RADIOLOGY & ADDITIONAL TESTS:      
80-year-old female past medical history of hypertension presenting after fall.  Shortly prior to arrival patient was on a 10 foot ladder that was not secured leading her to fall.  She endorsed right hip pain but denied head strike or LOC.  No AC.  Patient was not able to get up and stand afterwards.  Patient denies other injuries.  Per EMS patient alert and oriented x 3 during transport, was given 50 x 2 of Fent by EMS.  EMS also noted a blood pressure in 180s systolic.   patient endorses right hip pain but denies other pain.  Patient denies fever, headache, vision changes, chest pain, shortness of breath, abdominal pain, nausea, vomiting. Patient was brought to [facility] for further evaluation and treatment. Patient was found to have a right femoral shaft fracture. Patient seen now s/p an ORIF of the right femoral shaft fracture. Patient tolerated the procedure well. Seen resting comfortably OOB sitting in a chair. Patient has started working with physical therapy. continue complaint of pain at the surgical site. Patient with continued anemia requiring transfusion       MEDICATIONS  (STANDING):  acetaminophen     Tablet .. 975 milliGRAM(s) Oral every 8 hours  pantoprazole    Tablet 40 milliGRAM(s) Oral before breakfast  polyethylene glycol 3350 17 Gram(s) Oral at bedtime  rivaroxaban 10 milliGRAM(s) Oral daily  senna 2 Tablet(s) Oral at bedtime  sertraline 100 milliGRAM(s) Oral daily  sodium chloride 0.9%. 1000 milliLiter(s) (80 mL/Hr) IV Continuous <Continuous>  sodium chloride 0.9%. 1000 milliLiter(s) (125 mL/Hr) IV Continuous <Continuous>    MEDICATIONS  (PRN):  magnesium hydroxide Suspension 30 milliLiter(s) Oral daily PRN Constipation  ondansetron Injectable 4 milliGRAM(s) IV Push every 6 hours PRN Nausea and/or Vomiting  oxyCODONE    IR 10 milliGRAM(s) Oral every 3 hours PRN Severe Pain (7 - 10)  oxyCODONE    IR 5 milliGRAM(s) Oral every 3 hours PRN Moderate Pain (4 - 6)  traMADol 50 milliGRAM(s) Oral every 6 hours PRN Mild Pain (1 - 3)          VITALS:   T(C): 36.6 (04-23-25 @ 11:40), Max: 37.5 (04-22-25 @ 16:25)  HR: 78 (04-23-25 @ 11:40) (76 - 98)  BP: 142/74 (04-23-25 @ 11:40) (108/67 - 142/74)  RR: 18 (04-23-25 @ 11:40) (18 - 18)  SpO2: 100% (04-23-25 @ 11:40) (96% - 100%)  Wt(kg): --    PHYSICAL EXAM:  GENERAL: NAD, well-groomed, well-developed  HEAD:  Atraumatic, Normocephalic  EYES: EOMI, PERRLA, conjunctiva and sclera clear  ENMT: No tonsillar erythema, exudates, or enlargement; Moist mucous membranes, Good dentition, No lesions  NECK: Supple, No JVD, Normal thyroid  NERVOUS SYSTEM:  Alert & Oriented X3, Good concentration; Motor Strength 5/5 B/L upper and lower extremities; DTRs 2+ intact and symmetric  CHEST/LUNG: Clear to percussion bilaterally; No rales, rhonchi, wheezing, or rubs  HEART: Regular rate and rhythm; No murmurs, rubs, or gallops  ABDOMEN: Soft, Nontender, Nondistended; Bowel sounds present  EXTREMITIES:  2+ Peripheral Pulses, No clubbing, cyanosis, or edema  LYMPH: No lymphadenopathy noted  SKIN: No rashes or lesions    LABS:        CBC Full  -  ( 23 Apr 2025 06:27 )  WBC Count : 6.11 K/uL  RBC Count : 2.59 M/uL  Hemoglobin : 7.9 g/dL  Hematocrit : 24.1 %  Platelet Count - Automated : 128 K/uL  Mean Cell Volume : 93.1 fl  Mean Cell Hemoglobin : 30.5 pg  Mean Cell Hemoglobin Concentration : 32.8 g/dL  Auto Neutrophil # : x  Auto Lymphocyte # : x  Auto Monocyte # : x  Auto Eosinophil # : x  Auto Basophil # : x  Auto Neutrophil % : x  Auto Lymphocyte % : x  Auto Monocyte % : x  Auto Eosinophil % : x  Auto Basophil % : x    04-23    140  |  109[H]  |  17  ----------------------------<  110[H]  4.0   |  20[L]  |  1.00    Ca    8.0[L]      23 Apr 2025 06:28          Urinalysis Basic - ( 23 Apr 2025 06:28 )    Color: x / Appearance: x / SG: x / pH: x  Gluc: 110 mg/dL / Ketone: x  / Bili: x / Urobili: x   Blood: x / Protein: x / Nitrite: x   Leuk Esterase: x / RBC: x / WBC x   Sq Epi: x / Non Sq Epi: x / Bacteria: x      CAPILLARY BLOOD GLUCOSE          RADIOLOGY & ADDITIONAL TESTS:

## 2025-04-25 NOTE — PROGRESS NOTE ADULT - PROBLEM SELECTOR PLAN 2
will need to clarify meds  continue to monitor BP  will adjust meds as needed  low sodium diet.
Patient currently off antihypertensive meds   continue to monitor BP  will add meds as needed  low sodium diet.

## 2025-04-25 NOTE — PROGRESS NOTE ADULT - PROBLEM SELECTOR PROBLEM 4
ABLA (acute blood loss anemia)

## 2025-04-25 NOTE — DISCHARGE NOTE NURSING/CASE MANAGEMENT/SOCIAL WORK - FINANCIAL ASSISTANCE
[facility] provides services at a reduced cost to those who are determined to be eligible through [facility]’s financial assistance program. Information regarding [facility]’s financial assistance program can be found by going to [email or web address] or by calling [phone].

## 2025-04-25 NOTE — PROGRESS NOTE ADULT - PROVIDER SPECIALTY LIST ADULT
Orthopedics
Trauma Surgery
Internal Medicine

## 2025-04-25 NOTE — DISCHARGE NOTE NURSING/CASE MANAGEMENT/SOCIAL WORK - NSDCPEFALRISK_GEN_ALL_CORE
For information on Fall & Injury Prevention, visit: [email or web address] OR  [email or web address] OR  https://www.cdc.gov/steadi/patient.html

## 2025-04-25 NOTE — PROGRESS NOTE ADULT - PROBLEM SELECTOR PLAN 1
Patient is s/p an ORIF of the right femoral shaft fracture  continue wound care as per ortho  PO as tolerated  continue physical therapy   DVT and GI prophylaxis as per ortho.
Patient is s/p an ORIF of the right femoral shaft fracture  Patient tolerated the procedure well  PO as tolerated  Patient to start physical therapy   DVT and GI prophylaxis as per ortho.
Patient is s/p an ORIF of the right femoral shaft fracture  continue wound care as per ortho  PO as tolerated  continue physical therapy   DVT and GI prophylaxis as per ortho.
Patient is s/p an ORIF of the right femoral shaft fracture  Patient tolerated the procedure well  PO as tolerated  Patient to start physical therapy   DVT and GI prophylaxis as per ortho.

## 2025-04-25 NOTE — PROGRESS NOTE ADULT - PROBLEM SELECTOR PROBLEM 1
Right femoral shaft fracture

## 2025-04-25 NOTE — DISCHARGE NOTE NURSING/CASE MANAGEMENT/SOCIAL WORK - PATIENT PORTAL LINK FT
You can access the FollowMyHealth Patient Portal offered by [facility] by registering at the following website: [email or web address]. By joining [employer]’s FollowMyHealth portal, you will also be able to view your health information using other applications (apps) compatible with our system.

## 2025-04-25 NOTE — PROGRESS NOTE ADULT - TIME BILLING
Discussed treatment plan with patient and  at bedside.
Discussed treatment plan with patient and  at bedside.  Patient scheduled for DC to rehab  continue current meds  PO as tolerated  activity as tolerated  follow up with ortho  Patient and  aware of plan

## 2025-04-25 NOTE — PROGRESS NOTE ADULT - ASSESSMENT
A/P: 81 y/o female s/p right femur IMN, POD # 5    - Pain control/analgesia  - DVT ppx: Xarelto, SCDs  - PT/OT   - WBAT RLE  - OOB  - Incentive spirometer  - GI ppx  - Bowel regimen  - Follow up AM labs  - Appreciate internal medicine comanagement  - Notify ortho for questions  - Dispo: PT recommended TARUN Kan PA-C  Orthopedic Surgery Team  Team Pager #[phone]
80yFemale w/ pmh of hypertension, anxiety c/o R hip pain s/p mechanical fall 5 feet from a ladder this afternoon. Unable to bear weight in the RLE since the fall. Found to have R femoral fracture. Going emergently to OR with Orthopedic surgery. Trauma surgery consulted for evaluation.       List of injuries:   - Acute displaced fx of R proximal femoral shaft     Recommendations:  - C collar cleared by confrontation   - Tertiary negative  - Trauma to sign off. Please reconsult as needed.    Trauma Surgery   [id number]
80yFemale Stable POD4 R femoral shaft IMN    PLAN  -FU labs s/p 1 unit given 4/23  -WBAT  -Pain control  -PT/OT  -Ppx ABX  -DVT PE ppx- xarelto  -Incentive spirometry  -Dispo TARUN  
81 yo woman presents after a fall off a ladder. Patient found to have a right femoral shaft fracture. Patient seen now s/p an Open reduction and internal fixation of the right femoral shaft fracture 
81 yo woman presents after a fall off a ladder. Patient found to have a right femoral shaft fracture. Patient seen now s/p an Open reduction and internal fixation of the right femoral shft
79 yo woman presents after a fall off a ladder. Patient found to have a right femoral shaft fracture. Patient seen now s/p an Open reduction and internal fixation of the right femoral shaft fracture 
81 yo woman presents after a fall off a ladder. Patient found to have a right femoral shaft fracture. Patient seen now s/p an Open reduction and internal fixation of the right femoral shaft fracture

## 2025-05-06 RX ORDER — LOSARTAN POTASSIUM 100 MG/1
1 TABLET, FILM COATED ORAL
Qty: 0 | Refills: 0 | DISCHARGE

## 2025-05-06 RX ORDER — ONDANSETRON HCL/PF 4 MG/2 ML
1 VIAL (ML) INJECTION
Qty: 0 | Refills: 0 | DISCHARGE